# Patient Record
Sex: FEMALE | Race: WHITE | NOT HISPANIC OR LATINO | Employment: FULL TIME | URBAN - METROPOLITAN AREA
[De-identification: names, ages, dates, MRNs, and addresses within clinical notes are randomized per-mention and may not be internally consistent; named-entity substitution may affect disease eponyms.]

---

## 2018-05-30 ENCOUNTER — TRANSCRIBE ORDERS (OUTPATIENT)
Dept: PERINATAL CARE | Facility: CLINIC | Age: 33
End: 2018-05-30

## 2018-05-30 DIAGNOSIS — E63.9: Primary | ICD-10-CM

## 2018-05-30 DIAGNOSIS — O99.280: Primary | ICD-10-CM

## 2018-06-15 ENCOUNTER — OFFICE VISIT (OUTPATIENT)
Dept: PERINATAL CARE | Facility: CLINIC | Age: 33
End: 2018-06-15
Payer: COMMERCIAL

## 2018-06-15 VITALS
HEIGHT: 66 IN | DIASTOLIC BLOOD PRESSURE: 71 MMHG | HEART RATE: 108 BPM | WEIGHT: 127.6 LBS | BODY MASS INDEX: 20.51 KG/M2 | SYSTOLIC BLOOD PRESSURE: 105 MMHG

## 2018-06-15 DIAGNOSIS — K50.90 MATERNAL CROHN'S DISEASE AFFECTING PREGNANCY IN SECOND TRIMESTER (HCC): Primary | ICD-10-CM

## 2018-06-15 DIAGNOSIS — E63.9: ICD-10-CM

## 2018-06-15 DIAGNOSIS — O99.280: ICD-10-CM

## 2018-06-15 DIAGNOSIS — O99.612 MATERNAL CROHN'S DISEASE AFFECTING PREGNANCY IN SECOND TRIMESTER (HCC): Primary | ICD-10-CM

## 2018-06-15 PROCEDURE — 97802 MEDICAL NUTRITION INDIV IN: CPT | Performed by: DIETITIAN, REGISTERED

## 2018-06-15 RX ORDER — INFLIXIMAB 100 MG/10ML
300 INJECTION, POWDER, LYOPHILIZED, FOR SOLUTION INTRAVENOUS
COMMUNITY

## 2018-06-15 NOTE — PROGRESS NOTES
DATE: 06/15/18   RE: Vu Ling   : 1985  RONNELL: Estimated Date of Delivery: 18  EGA:  16 0/7 weeks    Dear Dr Alan Valentin: Thank you for referring your patient to the [facility], [facility]  at [address]  The patient received the following education at a medical nutrition therapy counseling session for Crohn's disease with pregnancy   Weight gain during in pregnancy  Based on the patients height of 5' 6" (1 676 m) inches, pre-pregnancy weight of 120 pounds (BMI- 19 3), we would recommend a total weight gain of 25-35 pounds for the pregnancy  o The patients current weight is 57 9 kg (127 lb 9 6 oz) pounds, and her weight gain to date is 7 5 pounds   Pregnancy Calorie/Protein needs: 2100 calories and 71 gms protein   Dietary Guidelines  o Basic review of macronutrients   o Meal pattern should consist of three small meals and three snacks daily  Advised to pair foods from several food groups at all snacks  Advised to shayna add protein to all snacks & breakfast  Patient currently eats 3 average sized meals& 2-3 snacks daily  Does add occasional junk foods to her meal plan    o Appropriate serving size of foods  o Meal plan provided: Normal Nutrition for Pregnancy, Healthy Tips for Pregnancy, & Nutrition for Crohn's disease  Diet recall indicates patient eats 2000 calories & 68 gm protein  Advised her to add a protein food  to every breakfast & all snacks  Advised her to eat 3 snacks daily  Only food intolerances due to Crohn's disease--almonds & all soda  States she has a hx of low vitamin B12; discusses food sources  o Use of food Diary to maintain a meal plan   Follow up: as needed shayna if low fetal growth or Crohn's flair To be followed routinely by her gastroenterologist shayna for blood work       Nutrition Goals:   Gradual weight gain to recommended amounts   Add a protein food to all 3 snacks daily    Thank you for the opportunity to participate in the care of this patient  I can be reached at [phone] should you have any questions  Time spent with patient 10:05-11 AM; time spent face to face counseling greater than 50% of the appointment      Sincerely,     Moiz Gonzalez  Diabetes Educator  Diabetes and Pregnancy Program

## 2019-02-12 ENCOUNTER — TELEPHONE (OUTPATIENT)
Dept: GASTROENTEROLOGY | Facility: CLINIC | Age: 34
End: 2019-02-12

## 2019-02-12 NOTE — TELEPHONE ENCOUNTER
CHELSEA mayo from [place], nurse at [employer] in [facility]; states Pt has Crohn's and gets Remicade 5 mg per Kg; needs new order today if possible to fax [phone]    ph # [phone]

## 2019-03-05 ENCOUNTER — OFFICE VISIT (OUTPATIENT)
Dept: GASTROENTEROLOGY | Facility: CLINIC | Age: 34
End: 2019-03-05
Payer: COMMERCIAL

## 2019-03-05 VITALS
WEIGHT: 138 LBS | BODY MASS INDEX: 22.18 KG/M2 | SYSTOLIC BLOOD PRESSURE: 110 MMHG | DIASTOLIC BLOOD PRESSURE: 68 MMHG | HEART RATE: 94 BPM | HEIGHT: 66 IN

## 2019-03-05 DIAGNOSIS — K50.80 CROHN'S DISEASE OF BOTH SMALL AND LARGE INTESTINE WITHOUT COMPLICATION (HCC): Primary | ICD-10-CM

## 2019-03-05 PROCEDURE — 99214 OFFICE O/P EST MOD 30 MIN: CPT | Performed by: INTERNAL MEDICINE

## 2019-03-05 NOTE — LETTER
March 5, 2019     Tanya Darden MD  [address]  [address] [place] [address]    Patient: Johnathon Ty   YOB: 1985   Date of Visit: 3/5/2019       Dear Dr Rosa Chavarria: Thank you for referring Johnathon Ty to me for evaluation  Below are my notes for this consultation  If you have questions, please do not hesitate to call me  I look forward to following your patient along with you           Sincerely,        Miley Shea MD        CC: No Recipients

## 2019-03-05 NOTE — PROGRESS NOTES
[facility] - Outpatient Follow-up Note  Christy No 29 y o  female MRN: [id number]  Encounter: [id number]    ASSESSMENT AND PLAN:      1  Crohn's disease of both small and large intestine without complication [facility])  [id number] female with Crohn's disease  Experienced a flare upon becoming pregnant about a year ago  Was seen in consultation by Rachele Nugent at [facility]  She was started on Remicade and maintain this throughout her pregnancy is essentially asymptomatic baby was delivered in December 4757 without complications  For the time being I would continue her Remicade  We discussed the value of documenting mucosal healing  At this point she is not very enthusiastic about colonoscopy  I have ordered blood work a sed rate as well as Remicade levels and antibodies  - CBC and Platelet; Future  - Comprehensive metabolic panel; Future  - Sedimentation rate, automated; Future  - INFLIXIMAB LEVEL AND ANTI-DRUG ANTIBODY FOR IBD; Future      Followup Appointment[de-identified]  Will review her blood work 1 year if everything is normal   ______________________________________________________________________    Chief Complaint   Patient presents with    Follow-up     Crohn's     HPI:  Patient delivered a healthy girl in December 2018  Baby is doing well  She did receive Remicade under the direct acting of Dr Jimmy Ng throughout pregnancy  She is basically asymptomatic from a Crohn's standpoint having no significant bleeding abdominal pain no joint pain or fatigue  She actually go several days without a bowel movement and then has a loose bowel movement every 4-7 days which was her pattern prior to pregnancy        Historical Information   Past Medical History:   Diagnosis Date    Crohn's disease [facility])      Past Surgical History:   Procedure Laterality Date    HERNIA REPAIR       Social History     Substance and Sexual Activity   Alcohol Use No     Social History     Substance and Sexual Activity   Drug Use No     Social History     Tobacco Use   Smoking Status Never Smoker   Smokeless Tobacco Never Used     Family History   Problem Relation Age of Onset    No Known Problems Mother     Hypertension Father     No Known Problems Sister     No Known Problems Brother     No Known Problems Maternal Grandmother     No Known Problems Maternal Grandfather     No Known Problems Paternal Grandmother     No Known Problems Paternal Grandfather     Colon polyps Neg Hx     Colon cancer Neg Hx          Current Outpatient Medications:     inFLIXimab (REMICADE) 100 mg    IRON PO  No Known Allergies    10 Point REVIEW OF SYSTEMS IS OTHERWISE NEGATIVE  PHYSICAL EXAM:    Blood pressure 110/68, pulse 94, height 5' 6" (1 676 m), weight 62 6 kg (138 lb)  Body mass index is 22 27 kg/m²  General Appearance:  Alert, cooperative, no distress  HEENT:  Normocephalic, atraumatic, anicteric  Neck: Supple, symmetrical, trachea midline  Lungs: Clear to auscultation bilaterally; no rales, rhonchi or wheezing; respirations unlabored   Heart: Regular rate and rhythm; no murmur, rub, or gallop  Abdomen:   Soft, non-tender, non-distended; normal bowel sounds; no masses, no organomegaly   Rectal:  Deferred   Extremities:  No cyanosis, clubbing or edema   Skin:  No jaundice, rashes, or lesions   Lymph nodes: No palpable cervical lymphadenopathy     Lab Results:   No results found for: WBC, HGB, HCT, MCV, PLT  No results found for: NA, K, CL, CO2, ANIONGAP, BUN, CREATININE, GLUCOSE, GLUF, CALCIUM, CORRECTEDCA, AST, ALT, ALKPHOS, PROT, BILITOT, EGFR  No results found for: IRON, TIBC, FERRITIN  No results found for: LIPASE    Radiology Results:   No results found

## 2019-04-07 LAB
ALBUMIN SERPL-MCNC: 4 G/DL (ref 3.6–5.1)
ALBUMIN/GLOB SERPL: 1.5 (CALC) (ref 1–2.5)
ALP SERPL-CCNC: 46 U/L (ref 33–115)
ALT SERPL-CCNC: 16 U/L (ref 6–29)
AST SERPL-CCNC: 19 U/L (ref 10–30)
BASOPHILS # BLD AUTO: 32 CELLS/UL (ref 0–200)
BASOPHILS NFR BLD AUTO: 0.5 %
BILIRUB SERPL-MCNC: 0.8 MG/DL (ref 0.2–1.2)
BUN SERPL-MCNC: 15 MG/DL (ref 7–25)
BUN/CREAT SERPL: ABNORMAL (CALC) (ref 6–22)
CALCIUM SERPL-MCNC: 9 MG/DL (ref 8.6–10.2)
CHLORIDE SERPL-SCNC: 107 MMOL/L (ref 98–110)
CO2 SERPL-SCNC: 26 MMOL/L (ref 20–32)
CREAT SERPL-MCNC: 0.9 MG/DL (ref 0.5–1.1)
EOSINOPHIL # BLD AUTO: 70 CELLS/UL (ref 15–500)
EOSINOPHIL NFR BLD AUTO: 1.1 %
ERYTHROCYTE [DISTWIDTH] IN BLOOD BY AUTOMATED COUNT: 13.1 % (ref 11–15)
ERYTHROCYTE [SEDIMENTATION RATE] IN BLOOD BY WESTERGREN METHOD: 6 MM/H
GLOBULIN SER CALC-MCNC: 2.7 G/DL (CALC) (ref 1.9–3.7)
GLUCOSE SERPL-MCNC: 100 MG/DL (ref 65–99)
HCT VFR BLD AUTO: 40.7 % (ref 35–45)
HGB BLD-MCNC: 13.9 G/DL (ref 11.7–15.5)
LYMPHOCYTES # BLD AUTO: 2445 CELLS/UL (ref 850–3900)
LYMPHOCYTES NFR BLD AUTO: 38.2 %
MCH RBC QN AUTO: 30.6 PG (ref 27–33)
MCHC RBC AUTO-ENTMCNC: 34.2 G/DL (ref 32–36)
MCV RBC AUTO: 89.6 FL (ref 80–100)
MONOCYTES # BLD AUTO: 493 CELLS/UL (ref 200–950)
MONOCYTES NFR BLD AUTO: 7.7 %
NEUTROPHILS # BLD AUTO: 3360 CELLS/UL (ref 1500–7800)
NEUTROPHILS NFR BLD AUTO: 52.5 %
PLATELET # BLD AUTO: 174 THOUSAND/UL (ref 140–400)
PMV BLD REES-ECKER: 12.6 FL (ref 7.5–12.5)
POTASSIUM SERPL-SCNC: 4.4 MMOL/L (ref 3.5–5.3)
PROT SERPL-MCNC: 6.7 G/DL (ref 6.1–8.1)
RBC # BLD AUTO: 4.54 MILLION/UL (ref 3.8–5.1)
SL AMB EGFR AFRICAN AMERICAN: 98 ML/MIN/1.73M2
SL AMB EGFR NON AFRICAN AMERICAN: 85 ML/MIN/1.73M2
SODIUM SERPL-SCNC: 140 MMOL/L (ref 135–146)
WBC # BLD AUTO: 6.4 THOUSAND/UL (ref 3.8–10.8)

## 2019-10-15 ENCOUNTER — TELEPHONE (OUTPATIENT)
Dept: GASTROENTEROLOGY | Facility: CLINIC | Age: 34
End: 2019-10-15

## 2019-10-15 DIAGNOSIS — K50.80 CROHN'S DISEASE OF BOTH SMALL AND LARGE INTESTINE WITHOUT COMPLICATION (HCC): Primary | ICD-10-CM

## 2020-02-06 ENCOUNTER — TELEPHONE (OUTPATIENT)
Dept: GASTROENTEROLOGY | Facility: CLINIC | Age: 35
End: 2020-02-06

## 2020-02-06 NOTE — TELEPHONE ENCOUNTER
I called the patient  I would continue the Remicade for now  Assess her including blood work probably stool for C diff  If it is not obvious she is having a flare of fecal calprotectin should be helpful if it is obvious that is not necessary  Would recommend she follow downtown with Dr Nessa Bennett who followed her through her last pregnancy    We can discuss the use of steroids if needed if she has particularly symptomatic or her blood work is abnormal

## 2020-02-06 NOTE — TELEPHONE ENCOUNTER
I spoke with Faniwilly Jose J at [facility], patient took a home pregnancy test Tuesday 2/4/2020  LMP: 12/28/2019 Approximately 5 weeks pregnant  Jag Lux is questioning if she can still have her Remicade Infusion today? I discussed with Dr Jennyfer Mandujano  Today's Infusion is to be cancelled  I relayed orders to Jag Lux  I spoke with patient; she was tearful expressing that she is experiencing increased abd cramping, only taking Tylenol  She feels as though she is in a flare  I didn't get to ask her about any other symptoms she may be having  I offered her an appointment tomorrow with Neal Garvey at 11 am  Patient agreeable  Dr Jennyfer Mandujano per Belkis's last pregnancy  (4/11/2018) She was about 4 weeks when we were notified  You spoke anonymously with Dr Gabrielle Quach; she was okay using and starting Remicade in pregnancy  She was then followed by Dr Gabrielle Quach throughout her pregnancy  Previous pregnancy history:  Apr (Easter): just got Cimzia and began having sx (abd pain/v, recurrent) --> ED, vomited again, Dilaudid, found out pregnant (due Nov 30), tx'd/admitted to [facility] from [place] and stayed to [place]; MRI/xray did not show obstruction but active inflammation, lot of diarrhea even just on CLD  Steroids in house and also for 2 wks  Started IFX end of Apr, s/p 2 doses so far  Per Dr Ser[facility] notes found in care everywhere: 5/2018  D/w pt that IFX is compatible with pregnancy and lactation (only trace amnts at most secreted into breast milk) and that this medication has not been associated with birth defects  Most important factor in healthy pregnancy is health of mother  Ideally would hold IFX in 3rd trimester since increased transfer across placenta during that time  Consider doing 4th and 5th doses at 7 wks so not given as far into 3rd trimester  That being said, if needed, fine to give IFX all the way through pregnancy  D/w pt consulting MFM or high risk OB       D/w pt that recommended to hold live vaccines for baby until 6 mos of age (essentially just Rotavirus)  Please touch base with Yasmany Fitzpatrick today since she will be seeing the patient tomorrow   Thanks

## 2020-02-06 NOTE — TELEPHONE ENCOUNTER
Please let me know what your usual treatment course would be for an active flare in someone who is 5 weeks pregnant  It sounds like she has been on Remicade and was actively flaring despite  Apparently this happened with her previous pregnancy  Am I safe to give her a prednisone taper? Should review considering a different med? I appreciate your assistance because I do new little direction with this    Thanks HPR

## 2020-02-06 NOTE — TELEPHONE ENCOUNTER
Paloma from [address] [place] [address] and Pt reports she is pregnant/reports LMP 12/28/19  Caller holding for nurse

## 2020-02-07 ENCOUNTER — OFFICE VISIT (OUTPATIENT)
Dept: GASTROENTEROLOGY | Facility: CLINIC | Age: 35
End: 2020-02-07
Payer: COMMERCIAL

## 2020-02-07 VITALS
BODY MASS INDEX: 20.41 KG/M2 | WEIGHT: 127 LBS | SYSTOLIC BLOOD PRESSURE: 92 MMHG | DIASTOLIC BLOOD PRESSURE: 62 MMHG | HEART RATE: 98 BPM | HEIGHT: 66 IN

## 2020-02-07 DIAGNOSIS — R10.9 ABDOMINAL CRAMPING: ICD-10-CM

## 2020-02-07 DIAGNOSIS — R11.0 NAUSEA: ICD-10-CM

## 2020-02-07 DIAGNOSIS — K50.80 CROHN'S DISEASE OF BOTH SMALL AND LARGE INTESTINE WITHOUT COMPLICATION (HCC): Primary | ICD-10-CM

## 2020-02-07 DIAGNOSIS — Z3A.01 LESS THAN 8 WEEKS GESTATION OF PREGNANCY: ICD-10-CM

## 2020-02-07 PROCEDURE — 99214 OFFICE O/P EST MOD 30 MIN: CPT | Performed by: NURSE PRACTITIONER

## 2020-02-07 RX ORDER — DICYCLOMINE HYDROCHLORIDE 10 MG/1
10 CAPSULE ORAL
Qty: 120 CAPSULE | Refills: 2 | Status: SHIPPED | OUTPATIENT
Start: 2020-02-07 | End: 2020-03-26 | Stop reason: ALTCHOICE

## 2020-02-07 NOTE — PROGRESS NOTES
[facility] - Outpatient Follow-up Note  Tanmay Choi 36 y o  female MRN: [id number]  Encounter: [id number]    ASSESSMENT AND PLAN:      1  Crohn's disease of both small and large intestine without complication (HCC)  2  Abdominal cramping  Follows with Dr Steven Jackman  Previously well controlled on Remicade  Has just been confirmed about 5 weeks pregnant with her 2nd child  Has had significant abdominal cramping which may represent a flare of her disease  Had 1 day of diarrhea but attributes this to GI bug going through or household    No bowel movements since     -will check routine labs including CRP, ESR and fecal calprotectin  -reschedule Remicade infusion as it is safe to proceed with use during pregnancy  It is ideally held during the 3rd trimester due to increased transfer across placenta, but will leave that discretion to Dr Steven Jackman, Dr Marcelo Martinez   -may use dicyclomine for the cramping; confirmed and reviewed with patient that it is safe to use during pregnancy but  not safe during lactation  -follow-up with Dr Marcelo Martinez at [facility] as recommended per Dr Martinez  - dicyclomine (BENTYL) 10 mg capsule; Take 1 capsule (10 mg total) by mouth 4 (four) times a day (before meals and at bedtime)  Dispense: 120 capsule; Refill: 2  - CBC; Future  - Comprehensive metabolic panel; Future  - C-reactive protein; Future  - Sedimentation rate, automated; Future  - Calprotectin,Fecal; Future    3  Nausea  Intermittent and no vomiting  Likely related to her current pregnancy but could be residual from recent [address] bug     4  Less than 8 weeks gestation of pregnancy  Patient is about 5 weeks gestation  This is her 2nd pregnancy  Has an appointment with OB on 02/19        Followup Appointment:  4 weeks as already scheduled 3/12 with Dr Steven Jackman  ______________________________________________________________________    Chief Complaint   Patient presents with    Crohn's Disease     flare- started 3 weeks ago- cramping     HPI:  30-year-old female patient seen in the office today after a boarding Remicade treatment earlier this week  She has just found out that she is 5 weeks pregnant with her 2nd child  She has been experiencing significant generalized abdominal cramping pain that is unbearable    Tylenol provides no relief  She did note that she in her family had GI bug over the weekend  She had fevers and chills on Saturday and Sunday with 4 liquid diarrhea stools on Monday  She has not had any further bowel movements but continues with this generalized abdominal cramping  She has nausea but no vomiting  Has been sticking to a brat, very light diet  Reports the cramping worsens if she eats any food with spice or heavier than playing chicken, rice, white bread type things  Denies UGI or alarm symptoms, melena, hematochezia  Appetite has been normal   No unintended weight loss      Historical Information   Past Medical History:   Diagnosis Date    Crohn's disease [facility])      Past Surgical History:   Procedure Laterality Date    INGUINAL HERNIA REPAIR  1988     Social History     Substance and Sexual Activity   Alcohol Use Not Currently    Frequency: Monthly or less    Drinks per session: 1 or 2    Binge frequency: Never    Comment:  very rare social but not currently with pregnancy ( February, 2020)     Social History     Substance and Sexual Activity   Drug Use No     Social History     Tobacco Use   Smoking Status Never Smoker   Smokeless Tobacco Never Used     Family History   Problem Relation Age of Onset    No Known Problems Mother     Hypertension Father     No Known Problems Brother     GI problems Maternal Grandmother     Kidney cancer Maternal Grandfather     No Known Problems Paternal Grandmother     No Known Problems Paternal Grandfather     Colon polyps Neg Hx     Colon cancer Neg Hx          Current Outpatient Medications:     Prenatal Vit-Fe Fumarate-FA (PRENATAL 1+1 PO)    dicyclomine (BENTYL) 10 mg capsule    inFLIXimab (REMICADE) 100 mg    IRON PO  No Known Allergies  Reviewed medications and allergies and updated as indicated    PHYSICAL EXAM:    Blood pressure 92/62, pulse 98, height 5' 6" (1 676 m), weight 57 6 kg (127 lb), unknown if currently breastfeeding  Body mass index is 20 5 kg/m²  General Appearance: NAD, cooperative, alert  Eyes: Anicteric, PERRLA, EOMI  ENT:  Normocephalic, atraumatic, normal mucosa  Neck:  Supple, symmetrical, trachea midline  Resp:  Clear to auscultation bilaterally; no rales, rhonchi or wheezing; respirations unlabored   CV:  S1 S2, Regular rate and rhythm; no murmur, rub, or gallop  GI:  Soft, non-tender, non-distended; normal bowel sounds; no masses, no organomegaly   Rectal: Deferred  Musculoskeletal: No cyanosis, clubbing or edema  Normal ROM  Skin:  No jaundice, rashes, or lesions   Heme/Lymph: No palpable cervical lymphadenopathy  Psych: Normal affect, good eye contact  Neuro: No gross deficits, AAOx3    Lab Results:   Lab Results   Component Value Date    WBC 6 4 04/06/2019    HGB 13 9 04/06/2019    HCT 40 7 04/06/2019    MCV 89 6 04/06/2019     04/06/2019     Lab Results   Component Value Date    K 4 4 04/06/2019     04/06/2019    CO2 26 04/06/2019    BUN 15 04/06/2019    CREATININE 0 90 04/06/2019    CALCIUM 9 0 04/06/2019    AST 19 04/06/2019    ALT 16 04/06/2019    ALKPHOS 46 04/06/2019     No results found for: IRON, TIBC, FERRITIN  No results found for: LIPASE    Radiology Results:   No results found

## 2020-02-07 NOTE — PATIENT INSTRUCTIONS
Reschedule your Remicade infusion as soon as you are able   You can take the dicyclomine up to 4 times a day for the abdominal cramping   complete the lab work and the stool study   follow-up with Dr Suhail Erickson  at [facility] (as recommended by Dr Hamilton Mccarthy)   follow up with your obstetrician as already scheduled

## 2020-02-08 NOTE — TELEPHONE ENCOUNTER
Had 1 day of diarrhea but entire family had GI bug  No further diarrhea  Did order the fecal calprotectin  She will reschedule her Remicade infusion  I also advised her to follow up with Dr Andie Martinez and she will do so  This doctor was in the network so I was able to route my note today additionally to her  Thanks, as always for your help      Thanks, HPR

## 2020-02-12 NOTE — TELEPHONE ENCOUNTER
Keyla Isadora from [facility] called requesting progress note/clearance stattement to proceed with Remicade Infusion during pregnancy  I faxed HPR's note to [phone]  I will also put in a referral to Dr Nakul Proctor since she should be followed by her throughout this pregnancy  Gino Weeks  is questioning what the plan is for Belkis's Remicade dosing moving forward  She has been getting Remicade 300 mg (per our orders ok to round up to the nearest 100 mg)  Her weight is 57 6 kg x5 mg/kg= 288; rounded top 300 mg   1) Do we want to keep her at a standard dose of 300 mg throughout the rest of her pregnancy? 2) Or do an exact weight based dose? which will be soley on pregnancy weight gain? 3) or weight based then continue to round up to nearest 100 mg, which could bump her up to 400 mg pending on her pregnancy weight

## 2020-02-14 LAB
ALBUMIN SERPL-MCNC: 3.9 G/DL (ref 3.6–5.1)
ALBUMIN/GLOB SERPL: 1.5 (CALC) (ref 1–2.5)
ALP SERPL-CCNC: 52 U/L (ref 31–125)
ALT SERPL-CCNC: 19 U/L (ref 6–29)
AST SERPL-CCNC: 13 U/L (ref 10–30)
BILIRUB SERPL-MCNC: 0.4 MG/DL (ref 0.2–1.2)
BUN SERPL-MCNC: 11 MG/DL (ref 7–25)
BUN/CREAT SERPL: NORMAL (CALC) (ref 6–22)
CALCIUM SERPL-MCNC: 9.1 MG/DL (ref 8.6–10.2)
CHLORIDE SERPL-SCNC: 105 MMOL/L (ref 98–110)
CO2 SERPL-SCNC: 24 MMOL/L (ref 20–32)
CREAT SERPL-MCNC: 0.61 MG/DL (ref 0.5–1.1)
CRP SERPL-MCNC: 11.7 MG/L
ERYTHROCYTE [DISTWIDTH] IN BLOOD BY AUTOMATED COUNT: 12.5 % (ref 11–15)
ERYTHROCYTE [SEDIMENTATION RATE] IN BLOOD BY WESTERGREN METHOD: 14 MM/H
GLOBULIN SER CALC-MCNC: 2.6 G/DL (CALC) (ref 1.9–3.7)
GLUCOSE SERPL-MCNC: 88 MG/DL (ref 65–99)
HCT VFR BLD AUTO: 37.1 % (ref 35–45)
HGB BLD-MCNC: 12.4 G/DL (ref 11.7–15.5)
MCH RBC QN AUTO: 30.1 PG (ref 27–33)
MCHC RBC AUTO-ENTMCNC: 33.4 G/DL (ref 32–36)
MCV RBC AUTO: 90 FL (ref 80–100)
PLATELET # BLD AUTO: 255 THOUSAND/UL (ref 140–400)
PMV BLD REES-ECKER: 12.1 FL (ref 7.5–12.5)
POTASSIUM SERPL-SCNC: 3.9 MMOL/L (ref 3.5–5.3)
PROT SERPL-MCNC: 6.5 G/DL (ref 6.1–8.1)
RBC # BLD AUTO: 4.12 MILLION/UL (ref 3.8–5.1)
SL AMB EGFR AFRICAN AMERICAN: 138 ML/MIN/1.73M2
SL AMB EGFR NON AFRICAN AMERICAN: 119 ML/MIN/1.73M2
SODIUM SERPL-SCNC: 137 MMOL/L (ref 135–146)
WBC # BLD AUTO: 9.5 THOUSAND/UL (ref 3.8–10.8)

## 2020-02-19 NOTE — TELEPHONE ENCOUNTER
Update:  Good Morning Megan! I am feeling a lot better, thank you! Sometimes my stomach will hurt if I eat too fast or I'm running around too much, but other than that, as long I take things slow, I'm good! :) I made an appointment with Dr Fan Santos for April 15th  Also, thank you for the reminder, I have put it on the calendar!

## 2020-02-19 NOTE — TELEPHONE ENCOUNTER
Standard dose is fine    I would like her to have an appointment with me in 2-3 months and also Dr Checo Montes in the next 2-3 months thanks

## 2020-02-19 NOTE — TELEPHONE ENCOUNTER
I forwarded progress notes to [facility] and Dr Willie Jeffries Fax# [phone]  Patient also notified

## 2020-02-27 LAB — CALPROTECTIN STL-MCNT: 397.2 MCG/G

## 2020-03-10 ENCOUNTER — OFFICE VISIT (OUTPATIENT)
Dept: GASTROENTEROLOGY | Facility: CLINIC | Age: 35
End: 2020-03-10
Payer: COMMERCIAL

## 2020-03-10 VITALS
DIASTOLIC BLOOD PRESSURE: 52 MMHG | SYSTOLIC BLOOD PRESSURE: 108 MMHG | HEIGHT: 66 IN | BODY MASS INDEX: 20.89 KG/M2 | WEIGHT: 130 LBS

## 2020-03-10 DIAGNOSIS — K50.80 CROHN'S DISEASE OF BOTH SMALL AND LARGE INTESTINE WITHOUT COMPLICATION (HCC): Primary | ICD-10-CM

## 2020-03-10 DIAGNOSIS — Z3A.10 10 WEEKS GESTATION OF PREGNANCY: ICD-10-CM

## 2020-03-10 PROCEDURE — 99214 OFFICE O/P EST MOD 30 MIN: CPT | Performed by: INTERNAL MEDICINE

## 2020-03-10 NOTE — LETTER
March 10, 2020     Hari Bethea MD  [address] [address] [address] [address]    Patient: Amrit Esquivel   YOB: 1985   Date of Visit: 3/10/2020       Dear Dr Judi Bah: Thank you for referring Amrit Esquivel to me for evaluation  Below are my notes for this consultation  If you have questions, please do not hesitate to call me  I look forward to following your patient along with you           Sincerely,        Arminda Day MD        CC: Rebecca Meredith MD

## 2020-03-10 NOTE — LETTER
March 10, 2020     Lucero Garg MD  [address] [address] [address]  [address]    Patient: Marcelo Peterson   YOB: 1985   Date of Visit: 3/10/2020       Dear Dr Betina Bower: Thank you for referring Marcelo Peterson to me for evaluation  Below are my notes for this consultation  If you have questions, please do not hesitate to call me  I look forward to following your patient along with you           Sincerely,        Armando Murdock MD        CC: MD Erlin Gonzalez MD

## 2020-03-10 NOTE — PROGRESS NOTES
[facility] - Outpatient Follow-up Note  Danny Simon 36 y o  female MRN: [id number]  Encounter: [id number]    ASSESSMENT AND PLAN:      1  Crohn's disease of both small and large intestine without complication ([address] )  Clinically in remission at 10 weeks pregnant  Her previous pregnancy approximately 2 years ago she did have a flare at the start of her pregnancy  She was seen by Dr Gayla Miranda at [facility] started on Remicade and did well for her pregnancy the Remicade was continued throughout the pregnancy and there were no Crohn's complications or pregnancy complications  For the time being the plan is to continue the medication she has an appointment to see Dr Gayla Miranda April 15th  I did review her labs which were all normal   Her fecal calprotectin is high at 397  Last colonoscopy was 2016  No changes right now  2  10 weeks gestation of pregnancy  As above      Followup Appointment:  3 months  ______________________________________________________________________    Chief Complaint   Patient presents with    Abdominal Cramping     HPI:  Doing well  About 10 weeks pregnant  Moves her bowels every other day formed no diarrhea no bleeding occasional upper abdominal discomfort which is transient  Appetite is good no issues with the pregnancy she does see Ob regularly  Last Remicade was approximately 4 weeks ago      Historical Information   Past Medical History:   Diagnosis Date    Crohn's disease [facility])      Past Surgical History:   Procedure Laterality Date    COLONOSCOPY  12/12/2016    EGD  06/06/2011    INGUINAL HERNIA REPAIR  1988     Social History     Substance and Sexual Activity   Alcohol Use Not Currently    Frequency: Monthly or less    Drinks per session: 1 or 2    Binge frequency: Never    Comment:  very rare social but not currently with pregnancy ( February, 2020)     Social History     Substance and Sexual Activity   Drug Use No     Social History Tobacco Use   Smoking Status Never Smoker   Smokeless Tobacco Never Used     Family History   Problem Relation Age of Onset    No Known Problems Mother     Hypertension Father     No Known Problems Brother     GI problems Maternal Grandmother     Kidney cancer Maternal Grandfather     No Known Problems Paternal Grandmother     No Known Problems Paternal Grandfather     Colon polyps Neg Hx     Colon cancer Neg Hx          Current Outpatient Medications:     inFLIXimab (REMICADE) 100 mg    Prenatal Vit-Fe Fumarate-FA (PRENATAL 1+1 PO)    dicyclomine (BENTYL) 10 mg capsule    IRON PO  No Known Allergies  Reviewed medications and allergies and updated as indicated    PHYSICAL EXAM:    Blood pressure 108/52, height 5' 6" (1 676 m), weight 59 kg (130 lb), unknown if currently breastfeeding  Body mass index is 20 98 kg/m²  General Appearance: NAD, cooperative, alert  Eyes: Anicteric, PERRLA, EOMI  ENT:  Normocephalic, atraumatic, normal mucosa  Neck:  Supple, symmetrical, trachea midline  Resp:  Clear to auscultation bilaterally; no rales, rhonchi or wheezing; respirations unlabored   CV:  S1 S2, Regular rate and rhythm; no murmur, rub, or gallop  GI:  Soft, non-tender, non-distended; normal bowel sounds; no masses, no organomegaly   Rectal: Deferred  Musculoskeletal: No cyanosis, clubbing or edema  Normal ROM    Skin:  No jaundice, rashes, or lesions   Heme/Lymph: No palpable cervical lymphadenopathy  Psych: Normal affect, good eye contact  Neuro: No gross deficits, AAOx3    Lab Results:   Lab Results   Component Value Date    WBC 9 5 02/13/2020    HGB 12 4 02/13/2020    HCT 37 1 02/13/2020    MCV 90 0 02/13/2020     02/13/2020     Lab Results   Component Value Date    K 3 9 02/13/2020     02/13/2020    CO2 24 02/13/2020    BUN 11 02/13/2020    CREATININE 0 61 02/13/2020    CALCIUM 9 1 02/13/2020    AST 13 02/13/2020    ALT 19 02/13/2020    ALKPHOS 52 02/13/2020     No results found for: IRON, TIBC, FERRITIN  No results found for: LIPASE    Radiology Results:   No results found

## 2020-03-24 ENCOUNTER — TRANSCRIBE ORDERS (OUTPATIENT)
Dept: PERINATAL CARE | Facility: OTHER | Age: 35
End: 2020-03-24

## 2020-03-24 ENCOUNTER — TELEPHONE (OUTPATIENT)
Dept: PERINATAL CARE | Facility: CLINIC | Age: 35
End: 2020-03-24

## 2020-03-24 DIAGNOSIS — O09.899 SUPERVISION OF OTHER HIGH RISK PREGNANCIES, UNSPECIFIED TRIMESTER: Primary | ICD-10-CM

## 2020-03-24 NOTE — TELEPHONE ENCOUNTER
[facility] Telephone Note:    Spoke with pt and confirmed appointment with [facility]  Pt advised of new visitor policy allowing NO support persons for appointment  PT also screened for COVID19      Cough: no  Fever: no  Shortness of breath:no  Known exposures to COVID-19, or international travel: no

## 2020-03-25 ENCOUNTER — TELEPHONE (OUTPATIENT)
Dept: PERINATAL CARE | Facility: CLINIC | Age: 35
End: 2020-03-25

## 2020-03-25 NOTE — TELEPHONE ENCOUNTER
[facility] Telephone Note:    Spoke with pt and confirmed appointment with [facility]  Pt advised of new visitor policy allowing NO support persons for appointment  Patient also advised of phone check in process  PT also screened for COVID19      Cough: no  Fever: no  Shortness of breath:no  Known exposures to COVID-19, or international travel: no

## 2020-03-26 ENCOUNTER — ROUTINE PRENATAL (OUTPATIENT)
Dept: PERINATAL CARE | Facility: CLINIC | Age: 35
End: 2020-03-26
Payer: COMMERCIAL

## 2020-03-26 VITALS — HEIGHT: 66 IN | WEIGHT: 129.6 LBS | BODY MASS INDEX: 20.83 KG/M2

## 2020-03-26 DIAGNOSIS — Z3A.12 12 WEEKS GESTATION OF PREGNANCY: ICD-10-CM

## 2020-03-26 DIAGNOSIS — Z36.82 NUCHAL TRANSLUCENCY OF FETUS ON PRENATAL ULTRASOUND: ICD-10-CM

## 2020-03-26 DIAGNOSIS — O09.899 SUPERVISION OF OTHER HIGH RISK PREGNANCIES, UNSPECIFIED TRIMESTER: ICD-10-CM

## 2020-03-26 DIAGNOSIS — K50.90 MATERNAL CROHN'S DISEASE AFFECTING PREGNANCY IN FIRST TRIMESTER (HCC): Primary | ICD-10-CM

## 2020-03-26 DIAGNOSIS — O99.611 MATERNAL CROHN'S DISEASE AFFECTING PREGNANCY IN FIRST TRIMESTER (HCC): Primary | ICD-10-CM

## 2020-03-26 PROBLEM — O99.619 MATERNAL CROHN'S DISEASE AFFECTING PREGNANCY (HCC): Status: ACTIVE | Noted: 2020-03-26

## 2020-03-26 PROCEDURE — 99213 OFFICE O/P EST LOW 20 MIN: CPT | Performed by: OBSTETRICS & GYNECOLOGY

## 2020-03-26 PROCEDURE — 76801 OB US < 14 WKS SINGLE FETUS: CPT | Performed by: OBSTETRICS & GYNECOLOGY

## 2020-03-26 PROCEDURE — 76813 OB US NUCHAL MEAS 1 GEST: CPT | Performed by: OBSTETRICS & GYNECOLOGY

## 2020-03-26 NOTE — LETTER
March 26, 2020     Otilio Santos  [address]  [address]    Patient: Marcelo Peterson   YOB: 1985   Date of Visit: 3/26/2020       Dear Dr Huitron: Thank you for referring Marcelo Peterson to me for evaluation  Below are my notes for this consultation  If you have questions, please do not hesitate to call me  I look forward to following your patient along with you  Sincerely,        Gladis Ochoa MD        CC: No Recipients  Gladis Ochoa MD  3/26/2020  4:57 PM  Sign at close encounter      Please see her ultrasound report that will be sent separately     Gladis Ochoa MD

## 2020-03-31 ENCOUNTER — TELEPHONE (OUTPATIENT)
Dept: PERINATAL CARE | Facility: OTHER | Age: 35
End: 2020-03-31

## 2020-03-31 NOTE — TELEPHONE ENCOUNTER
----- Message from Ro Chance MD sent at 3/31/2020  9:50 AM EDT -----  Patient updated with her lab results through my chart

## 2020-03-31 NOTE — TELEPHONE ENCOUNTER
Spoke with Tariq Navarro and gave her normal part 1 seq screen results  I also mailed the trf with verbal and written instructions  Tariq Navarro is aware she may go to a [facility] lab,specialty testing

## 2020-05-21 ENCOUNTER — TELEPHONE (OUTPATIENT)
Dept: PERINATAL CARE | Facility: OTHER | Age: 35
End: 2020-05-21

## 2020-05-22 ENCOUNTER — ROUTINE PRENATAL (OUTPATIENT)
Dept: PERINATAL CARE | Facility: CLINIC | Age: 35
End: 2020-05-22
Payer: COMMERCIAL

## 2020-05-22 VITALS
WEIGHT: 140.2 LBS | DIASTOLIC BLOOD PRESSURE: 56 MMHG | HEART RATE: 97 BPM | SYSTOLIC BLOOD PRESSURE: 108 MMHG | HEIGHT: 66 IN | TEMPERATURE: 96.2 F | BODY MASS INDEX: 22.53 KG/M2

## 2020-05-22 DIAGNOSIS — Z36.86 ENCOUNTER FOR ANTENATAL SCREENING FOR CERVICAL LENGTH: Primary | ICD-10-CM

## 2020-05-22 DIAGNOSIS — Z3A.20 20 WEEKS GESTATION OF PREGNANCY: ICD-10-CM

## 2020-05-22 DIAGNOSIS — Z36.89 ENCOUNTER FOR FETAL ANATOMIC SURVEY: ICD-10-CM

## 2020-05-22 PROBLEM — Z92.89 HISTORY OF TRANSFUSION: Status: ACTIVE | Noted: 2020-05-22

## 2020-05-22 PROCEDURE — 76805 OB US >/= 14 WKS SNGL FETUS: CPT | Performed by: OBSTETRICS & GYNECOLOGY

## 2020-05-22 PROCEDURE — 76817 TRANSVAGINAL US OBSTETRIC: CPT | Performed by: OBSTETRICS & GYNECOLOGY

## 2020-05-22 PROCEDURE — 99212 OFFICE O/P EST SF 10 MIN: CPT | Performed by: OBSTETRICS & GYNECOLOGY

## 2020-07-28 ENCOUNTER — TELEPHONE (OUTPATIENT)
Dept: GASTROENTEROLOGY | Facility: CLINIC | Age: 35
End: 2020-07-28

## 2020-07-28 NOTE — TELEPHONE ENCOUNTER
Message from [address]:  Leah Silverman is coming to me in [place] this week  I know you and Becky Contreras have been in touch about this patient only getting Remicade 300 mg continuously and not weight based since she's pregnant  With her coming this week do you mind sending us a new RX for Remicade 300 mg IV Q 8 weeks  I'm afraid she's going to come in on Thursday and we're going to weigh her    then ROUND UP to the nearest 100mg    and she'll end up getting 400mg because she's probably gained weight from being pregnant     Thanks,   [employer]

## 2020-07-28 NOTE — TELEPHONE ENCOUNTER
Dr Martinez I'm not sure if you saw Dr Carrol Hartley telemedicine visit from 4/2020? She advised to continue with IFX at present dose/frequency q 8 wks but will check TDM just prior to her next IFX (ordered)  I see her results dated 6/4/2020 I don't see any chart notes from [place], so not sure what Dr Alice Velázquez was? Infliximab Level, IBD 3 0 mcg/mL     Infliximab ADA, IBD 81 (H) <10 AU       Patient scheduled to have her last Remicade Infusion this Thursday 7/30  Her due date is 10/3/2020  I believe she is considering induction at 38 weeks

## 2020-07-28 NOTE — TELEPHONE ENCOUNTER
Tried to reach patient but I got a voicemail on asked her to call us back  The plan was to stop the Remicade early in the 3rd trimester  So 7/30 as the last date would be correct  I agree with not increasing the dose  I was not able to locate the actual lab slip  Would like to see that before I talked to the patient and/or text dr Hernandez Romero  Also if there are other drug levels and antibody levels that would be helpful   tx

## 2020-07-30 ENCOUNTER — TELEPHONE (OUTPATIENT)
Dept: PERINATAL CARE | Facility: CLINIC | Age: 35
End: 2020-07-30

## 2020-07-30 NOTE — TELEPHONE ENCOUNTER
Attempted to reach patient by phone and left voicemail to confirm appointment for MFM ultrasound  1 support person ( must be over the age of 15) may accompany you for your appointment  If you or your support person have traveled outside the state in the past 2 weeks, please call and notify our office today #[phone]  You and your support person must wear a mask ,covering nose and mouth,during your entire visit  You and your support person will have temperature screened upon arrival     To minimize your exposure in our waiting room, please call our office prior to entering the building  Check in and rooming questions will be done via phone  We will give you directions when to enter for your appointment  Inside office # provided:  [place] line: [phone]  [facility] line:  [phone]  [facility] line:  [address] line:  [phone]  [facility] line:  [phone]  [place] line:  [phone]    IF you are not feeling well- cough, fever, shortness of breath or any flu like symptoms, contact your primary care physician or [phone] Swift    Any questions with these instructions please call Maternal Fetal Medicine nurse line today @ # [phone]

## 2020-07-31 ENCOUNTER — ULTRASOUND (OUTPATIENT)
Dept: PERINATAL CARE | Facility: CLINIC | Age: 35
End: 2020-07-31
Payer: COMMERCIAL

## 2020-07-31 VITALS
HEIGHT: 66 IN | BODY MASS INDEX: 23.63 KG/M2 | HEART RATE: 88 BPM | DIASTOLIC BLOOD PRESSURE: 58 MMHG | SYSTOLIC BLOOD PRESSURE: 90 MMHG | WEIGHT: 147 LBS | TEMPERATURE: 97.3 F

## 2020-07-31 DIAGNOSIS — K50.90 MATERNAL CROHN'S DISEASE AFFECTING PREGNANCY IN THIRD TRIMESTER (HCC): Primary | ICD-10-CM

## 2020-07-31 DIAGNOSIS — O99.613 MATERNAL CROHN'S DISEASE AFFECTING PREGNANCY IN THIRD TRIMESTER (HCC): Primary | ICD-10-CM

## 2020-07-31 DIAGNOSIS — Z3A.30 30 WEEKS GESTATION OF PREGNANCY: ICD-10-CM

## 2020-07-31 PROCEDURE — 99212 OFFICE O/P EST SF 10 MIN: CPT | Performed by: OBSTETRICS & GYNECOLOGY

## 2020-07-31 PROCEDURE — 76816 OB US FOLLOW-UP PER FETUS: CPT | Performed by: OBSTETRICS & GYNECOLOGY

## 2020-07-31 NOTE — PATIENT INSTRUCTIONS
Kick Counts in Pregnancy   WHAT YOU NEED TO KNOW:   Kick counts measure how much your baby is moving in your womb  A kick from your baby can be felt as a twist, turn, swish, roll, or jab  It is common to feel your baby kicking at 26 to 28 weeks of pregnancy  You may feel your baby kick as early as 20 weeks of pregnancy  DISCHARGE INSTRUCTIONS:   Return to the emergency department if:   · You feel your baby kick less as the day goes on      · You do not feel any kicks in a day  Contact your healthcare provider if:   · You feel a change in the number of kicks or movements of your baby  · You feel fewer than 10 kicks within 2 hours after counting twice  · You have questions or concerns about your baby's movements  Why measure kick counts:  Your baby's movement may provide information about your baby's health  He may move less, or not at all, if there are problems  He may move less if he does not have enough room to grow in your uterus (womb)  He may also move less if he is not getting enough oxygen or nutrition from the placenta  Tell your healthcare provider as soon as you feel a change in your baby's movements  Problems that are found earlier are easier to treat  When to measure kick counts:   · Measure kick counts at the same time every day  · Measure kick counts when your baby is awake and most active  Your baby may be most active in the evening  · Measure kick counts after a meal or snack  Your baby may be more active after you eat  Wait 2 hours after you drink liquids that contain caffeine  Caffeine can make your baby more active than usual     · You should not smoke while you are pregnant  Smoking increases the risk of health problems for you and for your baby during your pregnancy  If you do smoke, wait 2 hours to measure kick counts  Nicotine can make your baby more active than usual   How to measure kick counts:  Check that your baby is awake before you measure kick counts   You can wake up your baby by lightly pushing on your belly, walking, or drinking something cold  Your healthcare provider may tell you different ways to measure kick counts  He may tell you to do the following:  · Use a chart or clock to keep track of the time you start and finish counting  · Sit in a chair or lie on your left side  · Place your hands on the largest part of your belly  · Count until you reach 10 kicks  Write down how much time it takes to count 10 kicks  · It may take 30 minutes to 2 hours to count 10 kicks  It should not take more than 2 hours to count 10 kicks  · If you do not feel 10 kicks within 2 hours, wait 1 hour and count again  Your baby can sleep for up to 40 minutes at one time  Follow up with your healthcare provider as directed:  Write down your questions so you remember to ask them during your visits  © 2017 [address] Information is for End User's use only and may not be sold, redistributed or otherwise used for commercial purposes  All illustrations and images included in CareNotes® are the copyrighted property of [employer]  or Huber Gonzalez  The above information is an  only  It is not intended as medical advice for individual conditions or treatments  Talk to your doctor, nurse or pharmacist before following any medical regimen to see if it is safe and effective for you

## 2020-07-31 NOTE — TELEPHONE ENCOUNTER
Dr Jalyn De La Rosa is in agreement with final dose of Remicade 300 mg now and holding for the rest of the pregnancy

## 2020-07-31 NOTE — PROGRESS NOTES
Please refer to the [facility] ultrasound report in Ob Procedures for additional information regarding today's visit

## 2020-11-05 ENCOUNTER — TELEPHONE (OUTPATIENT)
Dept: GASTROENTEROLOGY | Facility: CLINIC | Age: 35
End: 2020-11-05

## 2020-11-05 DIAGNOSIS — K50.80 CROHN'S DISEASE OF BOTH SMALL AND LARGE INTESTINE WITHOUT COMPLICATION (HCC): Primary | ICD-10-CM

## 2020-12-04 ENCOUNTER — OFFICE VISIT (OUTPATIENT)
Dept: GASTROENTEROLOGY | Facility: CLINIC | Age: 35
End: 2020-12-04
Payer: COMMERCIAL

## 2020-12-04 VITALS
WEIGHT: 132 LBS | HEIGHT: 66 IN | SYSTOLIC BLOOD PRESSURE: 112 MMHG | BODY MASS INDEX: 21.21 KG/M2 | DIASTOLIC BLOOD PRESSURE: 68 MMHG

## 2020-12-04 DIAGNOSIS — K50.80 CROHN'S DISEASE OF BOTH SMALL AND LARGE INTESTINE WITHOUT COMPLICATION (HCC): Primary | ICD-10-CM

## 2020-12-04 PROCEDURE — 99214 OFFICE O/P EST MOD 30 MIN: CPT | Performed by: INTERNAL MEDICINE

## 2021-02-22 LAB
ALBUMIN SERPL-MCNC: 4.1 G/DL (ref 3.6–5.1)
ALBUMIN/GLOB SERPL: 1.8 (CALC) (ref 1–2.5)
ALP SERPL-CCNC: 46 U/L (ref 31–125)
ALT SERPL-CCNC: 10 U/L (ref 6–29)
AST SERPL-CCNC: 11 U/L (ref 10–30)
BILIRUB SERPL-MCNC: 0.6 MG/DL (ref 0.2–1.2)
BUN SERPL-MCNC: 18 MG/DL (ref 7–25)
BUN/CREAT SERPL: NORMAL (CALC) (ref 6–22)
CALCIUM SERPL-MCNC: 9.3 MG/DL (ref 8.6–10.2)
CHLORIDE SERPL-SCNC: 108 MMOL/L (ref 98–110)
CO2 SERPL-SCNC: 27 MMOL/L (ref 20–32)
CREAT SERPL-MCNC: 0.74 MG/DL (ref 0.5–1.1)
CRP SERPL-MCNC: 0.9 MG/L
ERYTHROCYTE [DISTWIDTH] IN BLOOD BY AUTOMATED COUNT: 12.2 % (ref 11–15)
GLOBULIN SER CALC-MCNC: 2.3 G/DL (CALC) (ref 1.9–3.7)
GLUCOSE SERPL-MCNC: 93 MG/DL (ref 65–99)
HCT VFR BLD AUTO: 39.7 % (ref 35–45)
HGB BLD-MCNC: 13.2 G/DL (ref 11.7–15.5)
MCH RBC QN AUTO: 30.8 PG (ref 27–33)
MCHC RBC AUTO-ENTMCNC: 33.2 G/DL (ref 32–36)
MCV RBC AUTO: 92.8 FL (ref 80–100)
PLATELET # BLD AUTO: 171 THOUSAND/UL (ref 140–400)
PMV BLD REES-ECKER: 12.8 FL (ref 7.5–12.5)
POTASSIUM SERPL-SCNC: 4.1 MMOL/L (ref 3.5–5.3)
PROT SERPL-MCNC: 6.4 G/DL (ref 6.1–8.1)
RBC # BLD AUTO: 4.28 MILLION/UL (ref 3.8–5.1)
SL AMB EGFR AFRICAN AMERICAN: 123 ML/MIN/1.73M2
SL AMB EGFR NON AFRICAN AMERICAN: 106 ML/MIN/1.73M2
SODIUM SERPL-SCNC: 141 MMOL/L (ref 135–146)
WBC # BLD AUTO: 6.2 THOUSAND/UL (ref 3.8–10.8)

## 2021-11-04 DIAGNOSIS — Z11.1 SCREENING FOR TUBERCULOSIS: Primary | ICD-10-CM

## 2022-05-04 ENCOUNTER — TELEPHONE (OUTPATIENT)
Dept: GASTROENTEROLOGY | Facility: CLINIC | Age: 37
End: 2022-05-04

## 2022-05-04 ENCOUNTER — OFFICE VISIT (OUTPATIENT)
Dept: GASTROENTEROLOGY | Facility: CLINIC | Age: 37
End: 2022-05-04
Payer: COMMERCIAL

## 2022-05-04 VITALS
BODY MASS INDEX: 19.13 KG/M2 | SYSTOLIC BLOOD PRESSURE: 118 MMHG | DIASTOLIC BLOOD PRESSURE: 72 MMHG | HEART RATE: 94 BPM | HEIGHT: 66 IN | WEIGHT: 119 LBS

## 2022-05-04 DIAGNOSIS — K50.00 CROHN'S DISEASE OF SMALL INTESTINE WITHOUT COMPLICATION (HCC): Primary | ICD-10-CM

## 2022-05-04 PROCEDURE — 99214 OFFICE O/P EST MOD 30 MIN: CPT | Performed by: NURSE PRACTITIONER

## 2022-05-04 NOTE — LETTER
May 4, 2022     Shirley Crespo MD  [address]  [address] [place] [address]    Patient: Merritt Shankar   YOB: 1985   Date of Visit: 5/4/2022       Dear Dr Raven Rivera: Thank you for referring Merritt Shankar to me for evaluation  Below are my notes for this consultation  If you have questions, please do not hesitate to call me  I look forward to following your patient along with you  Sincerely,        IVAN Baxter Ra        CC: No Recipients  IVAN Baxter Ra  5/4/2022  5:02 PM  Sign when Signing Visit  [facility] - Outpatient Follow-up Note  Merritt Shankar 29 y o  female MRN: [id number]  Encounter: [id number]    ASSESSMENT AND PLAN:    1  Crohn's disease  Diagnosed in 2011  Most recent colonoscopy 12/2016 -  Normal mucosa in the whole colon however cecal biopsy showed mild chronic activity  Has been well controlled on Remicade since 2020 however she is no w experiencing mild symptoms 1-2 weeks prior to Remicade infusion  Had severe episode of lower abdominal pain in January 2022  Unclear if her symptoms are due to inadequate Remicade levels prior to infusion or due to development of antibodies making Remicade ineffective  -   Check Remicade levels and antibodies  -  Will check routine IBD labs as she has not been seen since 2020 - CBC, CMP, CRP, sed rate  - will check yearly hepatitis-B surface antigen and QuantiFERON  - schedule for colonoscopy at [facility] to evaluate disease activity    Followup Appointment: 2  months  ______________________________________________________________________    Chief Complaint   Patient presents with    Follow up-Crohn's, has had a few flare up's     HPI:  Followup for  Crohn's disease diagnosed 2011  Colonoscopy at that time showed disease at ileum  Most recent colonoscopy was 12/2016 -  Normal mucosa in the whole colon however cecal biopsy showed mild chronic activity        Currently on Remicade  2020  With last 3 Remicade infusion, patient feels flare of symptoms approximately 1 -2 week prior to infusion  Experiences lower abdominal cramping which increases with eating  Had severe flare in January 2022 with severe abdominal pain and needed to significantly restricted her diet for approximately 2 weeks due to abdominal pain  Denies increase stooling or rectal bleeding  She actually reports that she has some constipation with bowel movements every  2-7 days  No melena or rectal bleeding      She has significant stress in her life due to her daughter who recently received a lung transplant  Has lost approximately 10 lb over the past year and reports decreased appetite and poor dietary habits    She is due for infusion this Friday May 6 but had to reschedule until May 11th    She is currently fairly comfortable and denies significant lower abdominal pain        Historical Information   Past Medical History:   Diagnosis Date    Crohn's disease [facility])      Past Surgical History:   Procedure Laterality Date    COLONOSCOPY  12/12/2016    EGD  06/06/2011    INGUINAL HERNIA REPAIR  1988     Social History     Substance and Sexual Activity   Alcohol Use Not Currently    Comment:  very rare social but not currently with pregnancy ( February, 2020)     Social History     Substance and Sexual Activity   Drug Use No     Social History     Tobacco Use   Smoking Status Never Smoker   Smokeless Tobacco Never Used     Family History   Problem Relation Age of Onset    No Known Problems Mother     Hypertension Father     No Known Problems Brother     GI problems Maternal Grandmother     Kidney cancer Maternal Grandfather     No Known Problems Paternal Grandmother     No Known Problems Paternal Grandfather     Colon polyps Neg Hx     Colon cancer Neg Hx          Current Outpatient Medications:     inFLIXimab (REMICADE) 100 mg    Prenatal Vit-Fe Fumarate-FA (PRENATAL 1+1 PO)  No Known Allergies  Reviewed medications and allergies and updated as indicated    PHYSICAL EXAM:    Blood pressure 118/72, pulse 94, height 5' 6" (1 676 m), weight 54 kg (119 lb), unknown if currently breastfeeding  Body mass index is 19 21 kg/m²  General Appearance: NAD, cooperative, alert  Eyes: Anicteric  ENT:  Normocephalic, atraumatic, normal mucosa  Neck:  Supple, symmetrical, trachea midline  Resp:  Clear to auscultation bilaterally; no rales, rhonchi or wheezing; respirations unlabored   CV:  S1 S2, Regular rate and rhythm; no murmur, rub, or gallop  GI:  Soft, non-tender, non-distended; normal bowel sounds; no masses, no organomegaly   Rectal: Deferred  Musculoskeletal: No cyanosis, clubbing or edema  Normal ROM    Skin:  No jaundice, rashes, or lesions   Psych: Normal affect, good eye contact  Neuro: No gross deficits, AAOx3    Lab Results:   Lab Results   Component Value Date    WBC 6 2 02/20/2021    HGB 13 2 02/20/2021    HCT 39 7 02/20/2021    MCV 92 8 02/20/2021     02/20/2021     Lab Results   Component Value Date    K 4 1 02/20/2021     02/20/2021    CO2 27 02/20/2021    BUN 18 02/20/2021    CREATININE 0 74 02/20/2021    CALCIUM 9 3 02/20/2021    AST 11 02/20/2021    ALT 10 02/20/2021    ALKPHOS 46 02/20/2021

## 2022-05-04 NOTE — TELEPHONE ENCOUNTER
Spoke to pt  Transferred to  to schedule appt re: biologic f/u  Patient reports she is starting to flare prior to remicade infusion  She has appt this afternoon with IVAN Guido  Overdue for Hep B Surface antigen and TB Gold  Will also need remicade AB and level done please

## 2022-05-04 NOTE — TELEPHONE ENCOUNTER
Scheduled date of colonoscopy (as of today):6/29/22  Physician performing colonoscopy:Dr Rafita Ford  Location of colonoscopy:[facility]  Bowel prep reviewed with patient:Carrol Pedersen  Instructions reviewed with patient by: Kenneth Purdy   Clearances: No

## 2022-05-04 NOTE — PROGRESS NOTES
[facility] - Outpatient Follow-up Note  Stephon Clark 29 y o  female MRN: [id number]  Encounter: [id number]    ASSESSMENT AND PLAN:    1  Crohn's disease  Diagnosed in 2011  Most recent colonoscopy 12/2016 -  Normal mucosa in the whole colon however cecal biopsy showed mild chronic activity  Has been well controlled on Remicade since 2020 however she is no w experiencing mild symptoms 1-2 weeks prior to Remicade infusion  Had severe episode of lower abdominal pain in January 2022  Unclear if her symptoms are due to inadequate Remicade levels prior to infusion or due to development of antibodies making Remicade ineffective  -   Check Remicade levels and antibodies  - patient will proceed with next Remicade infusion May 11  Will make further decisions regarding  management once Remicade levels and antibodies reviewed and colonoscopy performed to determine current status of Crohn  -  Will check routine IBD labs as she has not been seen since 2020 - CBC, CMP, CRP, sed rate  - will check yearly hepatitis-B surface antigen and QuantiFERON  - schedule for colonoscopy at [facility] to evaluate disease activity    Followup Appointment: 2  months  ______________________________________________________________________    Chief Complaint   Patient presents with    Follow up-Crohn's, has had a few flare up's     HPI:  Followup for  Crohn's disease diagnosed 2011  Colonoscopy at that time showed disease at ileum  Most recent colonoscopy was 12/2016 -  Normal mucosa in the whole colon however cecal biopsy showed mild chronic activity  Currently on Remicade  2020  With last 3 Remicade infusion, patient feels flare of symptoms approximately 1 -2 week prior to infusion  Experiences lower abdominal cramping which increases with eating    Had severe flare in January 2022 with severe abdominal pain and needed to significantly restricted her diet for approximately 2 weeks due to abdominal pain  Denies increase stooling or rectal bleeding  She actually reports that she has some constipation with bowel movements every  2-7 days  No melena or rectal bleeding      She has significant stress in her life due to her daughter who recently received a lung transplant  Has lost approximately 10 lb over the past year and reports decreased appetite and poor dietary habits    She is due for infusion this Friday May 6 but had to reschedule until May 11th  She is currently fairly comfortable and denies significant lower abdominal pain        Historical Information   Past Medical History:   Diagnosis Date    Crohn's disease [facility])      Past Surgical History:   Procedure Laterality Date    COLONOSCOPY  12/12/2016    EGD  06/06/2011    INGUINAL HERNIA REPAIR  1988     Social History     Substance and Sexual Activity   Alcohol Use Not Currently    Comment:  very rare social but not currently with pregnancy ( February, 2020)     Social History     Substance and Sexual Activity   Drug Use No     Social History     Tobacco Use   Smoking Status Never Smoker   Smokeless Tobacco Never Used     Family History   Problem Relation Age of Onset    No Known Problems Mother     Hypertension Father     No Known Problems Brother     GI problems Maternal Grandmother     Kidney cancer Maternal Grandfather     No Known Problems Paternal Grandmother     No Known Problems Paternal Grandfather     Colon polyps Neg Hx     Colon cancer Neg Hx          Current Outpatient Medications:     inFLIXimab (REMICADE) 100 mg    Prenatal Vit-Fe Fumarate-FA (PRENATAL 1+1 PO)  No Known Allergies  Reviewed medications and allergies and updated as indicated    PHYSICAL EXAM:    Blood pressure 118/72, pulse 94, height 5' 6" (1 676 m), weight 54 kg (119 lb), unknown if currently breastfeeding  Body mass index is 19 21 kg/m²    General Appearance: NAD, cooperative, alert  Eyes: Anicteric  ENT:  Normocephalic, atraumatic, normal mucosa  Neck:  Supple, symmetrical, trachea midline  Resp:  Clear to auscultation bilaterally; no rales, rhonchi or wheezing; respirations unlabored   CV:  S1 S2, Regular rate and rhythm; no murmur, rub, or gallop  GI:  Soft, non-tender, non-distended; normal bowel sounds; no masses, no organomegaly   Rectal: Deferred  Musculoskeletal: No cyanosis, clubbing or edema  Normal ROM    Skin:  No jaundice, rashes, or lesions   Psych: Normal affect, good eye contact  Neuro: No gross deficits, AAOx3    Lab Results:   Lab Results   Component Value Date    WBC 6 2 02/20/2021    HGB 13 2 02/20/2021    HCT 39 7 02/20/2021    MCV 92 8 02/20/2021     02/20/2021     Lab Results   Component Value Date    K 4 1 02/20/2021     02/20/2021    CO2 27 02/20/2021    BUN 18 02/20/2021    CREATININE 0 74 02/20/2021    CALCIUM 9 3 02/20/2021    AST 11 02/20/2021    ALT 10 02/20/2021    ALKPHOS 46 02/20/2021

## 2022-05-13 LAB
ALBUMIN SERPL-MCNC: 4.1 G/DL (ref 3.6–5.1)
ALBUMIN/GLOB SERPL: 1.6 (CALC) (ref 1–2.5)
ALP SERPL-CCNC: 56 U/L (ref 31–125)
ALT SERPL-CCNC: 8 U/L (ref 6–29)
AST SERPL-CCNC: 13 U/L (ref 10–30)
BILIRUB SERPL-MCNC: 0.6 MG/DL (ref 0.2–1.2)
BUN SERPL-MCNC: 11 MG/DL (ref 7–25)
BUN/CREAT SERPL: NORMAL (CALC) (ref 6–22)
CALCIUM SERPL-MCNC: 9.2 MG/DL (ref 8.6–10.2)
CHLORIDE SERPL-SCNC: 106 MMOL/L (ref 98–110)
CO2 SERPL-SCNC: 25 MMOL/L (ref 20–32)
CREAT SERPL-MCNC: 0.77 MG/DL (ref 0.5–1.1)
CRP SERPL-MCNC: 1.6 MG/L
ERYTHROCYTE [DISTWIDTH] IN BLOOD BY AUTOMATED COUNT: 12.2 % (ref 11–15)
GLOBULIN SER CALC-MCNC: 2.6 G/DL (CALC) (ref 1.9–3.7)
GLUCOSE SERPL-MCNC: 127 MG/DL (ref 65–139)
HBV SURFACE AG SERPL QL IA: NORMAL
HBV SURFACE AG SERPL QL NT: NORMAL
HCT VFR BLD AUTO: 38.9 % (ref 35–45)
HGB BLD-MCNC: 13 G/DL (ref 11.7–15.5)
MCH RBC QN AUTO: 30.4 PG (ref 27–33)
MCHC RBC AUTO-ENTMCNC: 33.4 G/DL (ref 32–36)
MCV RBC AUTO: 91.1 FL (ref 80–100)
PLATELET # BLD AUTO: 188 THOUSAND/UL (ref 140–400)
PMV BLD REES-ECKER: 12.9 FL (ref 7.5–12.5)
POTASSIUM SERPL-SCNC: 4 MMOL/L (ref 3.5–5.3)
PROT SERPL-MCNC: 6.7 G/DL (ref 6.1–8.1)
RBC # BLD AUTO: 4.27 MILLION/UL (ref 3.8–5.1)
SL AMB EGFR AFRICAN AMERICAN: 116 ML/MIN/1.73M2
SL AMB EGFR NON AFRICAN AMERICAN: 100 ML/MIN/1.73M2
SODIUM SERPL-SCNC: 139 MMOL/L (ref 135–146)
WBC # BLD AUTO: 8.4 THOUSAND/UL (ref 3.8–10.8)

## 2022-05-26 ENCOUNTER — TELEPHONE (OUTPATIENT)
Dept: GASTROENTEROLOGY | Facility: CLINIC | Age: 37
End: 2022-05-26

## 2022-05-26 NOTE — TELEPHONE ENCOUNTER
Zainab from [facility] called advising that [facility] never processed patients Quant Tb Gold & HBsAg  She reported the incident to [facility] as the "refrigerated specimens" were left behind and never processed over the weekend  I spoke with patient  Her last Infusion was around  5/11/22; she will be due again 7/6/22  She will plan to go for labs 1 week prior ( last week in June); she plans to go back to [facility] to have drawn  CBC, CMP & CRP stable  Faxed to [address] to be scanned into chart for review

## 2022-06-28 ENCOUNTER — ANESTHESIA (OUTPATIENT)
Dept: ANESTHESIOLOGY | Facility: AMBULATORY SURGERY CENTER | Age: 37
End: 2022-06-28

## 2022-06-28 ENCOUNTER — ANESTHESIA EVENT (OUTPATIENT)
Dept: ANESTHESIOLOGY | Facility: AMBULATORY SURGERY CENTER | Age: 37
End: 2022-06-28

## 2022-06-28 NOTE — ANESTHESIA PREPROCEDURE EVALUATION
Procedure:  PRE-OP ONLY    Relevant Problems   Digestive   (+) Crohn's disease of both small and large intestine without complication ([facility] [address] )      Other   (+) History of transfusion             Anesthesia Plan  ASA Score- 2     Anesthesia Type- IV sedation with anesthesia with ASA Monitors  Additional Monitors:   Airway Plan:           Plan Factors-    Chart reviewed  Patient is not a current smoker  Induction- intravenous  Postoperative Plan-     Informed Consent- Anesthetic plan and risks discussed with patient  I personally reviewed this patient with the CRNA  Discussed and agreed on the Anesthesia Plan with the YUMIKO Masters

## 2022-06-29 ENCOUNTER — HOSPITAL ENCOUNTER (OUTPATIENT)
Dept: GASTROENTEROLOGY | Facility: AMBULATORY SURGERY CENTER | Age: 37
Discharge: HOME | End: 2022-06-29
Payer: COMMERCIAL

## 2022-06-29 ENCOUNTER — ANESTHESIA EVENT (OUTPATIENT)
Dept: GASTROENTEROLOGY | Facility: AMBULATORY SURGERY CENTER | Age: 37
End: 2022-06-29

## 2022-06-29 ENCOUNTER — ANESTHESIA (OUTPATIENT)
Dept: GASTROENTEROLOGY | Facility: AMBULATORY SURGERY CENTER | Age: 37
End: 2022-06-29

## 2022-06-29 VITALS
OXYGEN SATURATION: 98 % | SYSTOLIC BLOOD PRESSURE: 112 MMHG | WEIGHT: 115 LBS | DIASTOLIC BLOOD PRESSURE: 58 MMHG | HEART RATE: 88 BPM | TEMPERATURE: 98.7 F | HEIGHT: 66 IN | BODY MASS INDEX: 18.48 KG/M2 | RESPIRATION RATE: 19 BRPM

## 2022-06-29 DIAGNOSIS — K50.00 CROHN'S DISEASE OF SMALL INTESTINE WITHOUT COMPLICATION (HCC): ICD-10-CM

## 2022-06-29 LAB
EXT PREGNANCY TEST URINE: NEGATIVE
EXT. CONTROL: NORMAL

## 2022-06-29 PROCEDURE — 45380 COLONOSCOPY AND BIOPSY: CPT | Performed by: INTERNAL MEDICINE

## 2022-06-29 PROCEDURE — 88305 TISSUE EXAM BY PATHOLOGIST: CPT | Performed by: PATHOLOGY

## 2022-06-29 RX ORDER — PROPOFOL 10 MG/ML
INJECTION, EMULSION INTRAVENOUS AS NEEDED
Status: DISCONTINUED | OUTPATIENT
Start: 2022-06-29 | End: 2022-06-29

## 2022-06-29 RX ORDER — SODIUM CHLORIDE, SODIUM LACTATE, POTASSIUM CHLORIDE, CALCIUM CHLORIDE 600; 310; 30; 20 MG/100ML; MG/100ML; MG/100ML; MG/100ML
INJECTION, SOLUTION INTRAVENOUS CONTINUOUS PRN
Status: DISCONTINUED | OUTPATIENT
Start: 2022-06-29 | End: 2022-06-29

## 2022-06-29 RX ORDER — LIDOCAINE HYDROCHLORIDE 10 MG/ML
INJECTION, SOLUTION EPIDURAL; INFILTRATION; INTRACAUDAL; PERINEURAL AS NEEDED
Status: DISCONTINUED | OUTPATIENT
Start: 2022-06-29 | End: 2022-06-29

## 2022-06-29 RX ORDER — SODIUM CHLORIDE, SODIUM LACTATE, POTASSIUM CHLORIDE, CALCIUM CHLORIDE 600; 310; 30; 20 MG/100ML; MG/100ML; MG/100ML; MG/100ML
50 INJECTION, SOLUTION INTRAVENOUS CONTINUOUS
Status: DISCONTINUED | OUTPATIENT
Start: 2022-06-29 | End: 2022-07-03 | Stop reason: HOSPADM

## 2022-06-29 RX ADMIN — SODIUM CHLORIDE, SODIUM LACTATE, POTASSIUM CHLORIDE, CALCIUM CHLORIDE: 600; 310; 30; 20 INJECTION, SOLUTION INTRAVENOUS at 12:07

## 2022-06-29 RX ADMIN — PROPOFOL 50 MG: 10 INJECTION, EMULSION INTRAVENOUS at 12:25

## 2022-06-29 RX ADMIN — SODIUM CHLORIDE, SODIUM LACTATE, POTASSIUM CHLORIDE, CALCIUM CHLORIDE 50 ML/HR: 600; 310; 30; 20 INJECTION, SOLUTION INTRAVENOUS at 11:24

## 2022-06-29 RX ADMIN — PROPOFOL 50 MG: 10 INJECTION, EMULSION INTRAVENOUS at 12:18

## 2022-06-29 RX ADMIN — PROPOFOL 50 MG: 10 INJECTION, EMULSION INTRAVENOUS at 12:21

## 2022-06-29 RX ADMIN — PROPOFOL 100 MG: 10 INJECTION, EMULSION INTRAVENOUS at 12:11

## 2022-06-29 RX ADMIN — PROPOFOL 50 MG: 10 INJECTION, EMULSION INTRAVENOUS at 12:14

## 2022-06-29 RX ADMIN — LIDOCAINE HYDROCHLORIDE 30 MG: 10 INJECTION, SOLUTION EPIDURAL; INFILTRATION; INTRACAUDAL; PERINEURAL at 12:11

## 2022-06-29 NOTE — ANESTHESIA PREPROCEDURE EVALUATION
Procedure:  COLONOSCOPY    Relevant Problems   No relevant active problems      Crohn's disease ([address] ) last [place] 2/2022 Resolved  No hospitalization needed  Physical Exam    Airway    Mallampati score: I  TM Distance: >3 FB  Neck ROM: full     Dental   No notable dental hx     Cardiovascular      Pulmonary      Other Findings        Anesthesia Plan  ASA Score- 2     Anesthesia Type- IV sedation with anesthesia with ASA Monitors  Additional Monitors:   Airway Plan:           Plan Factors-Exercise tolerance (METS): >4 METS  Chart reviewed  Patient is not a current smoker  Induction- intravenous  Postoperative Plan-     Informed Consent- Anesthetic plan and risks discussed with patient  I personally reviewed this patient with the CRNA  Discussed and agreed on the Anesthesia Plan with the CRNA  Solitario Dave

## 2022-06-29 NOTE — ANESTHESIA POSTPROCEDURE EVALUATION
Post-Op Assessment Note    CV Status:  Stable  Pain Score: 0    Pain management: adequate     Mental Status:  Alert and awake   Hydration Status:  Euvolemic   PONV Controlled:  Controlled   Airway Patency:  Patent      Post Op Vitals Reviewed: Yes      Staff: Anesthesiologist, CRNA         No complications documented      BP   94/56   Temp      Pulse  79   Resp   18   SpO2   100

## 2022-06-29 NOTE — H&P
History and Physical -  [facility]  Samra England 40 y o  female MRN: [id number]    HPI: Samra England is a 40y o  year old female who presents for colonoscopy for history of Crohn's disease    REVIEW OF SYSTEMS: Per the HPI, and otherwise unremarkable  Historical Information   Past Medical History:   Diagnosis Date    Crohn's disease [facility])      Past Surgical History:   Procedure Laterality Date    COLONOSCOPY  12/12/2016    COLONOSCOPY      EGD  06/06/2011    INGUINAL HERNIA REPAIR  1988     Social History   Social History     Substance and Sexual Activity   Alcohol Use Not Currently     Social History     Substance and Sexual Activity   Drug Use No     Social History     Tobacco Use   Smoking Status Never Smoker   Smokeless Tobacco Never Used     Family History   Problem Relation Age of Onset    No Known Problems Mother     Hypertension Father     No Known Problems Brother     GI problems Maternal Grandmother     Kidney cancer Maternal Grandfather     No Known Problems Paternal Grandmother     No Known Problems Paternal Grandfather     Colon polyps Neg Hx     Colon cancer Neg Hx        Meds/Allergies       Current Outpatient Medications:     Prenatal Vit-Fe Fumarate-FA (PRENATAL 1+1 PO)    inFLIXimab (REMICADE) 100 mg    Current Facility-Administered Medications:     lactated ringers infusion, 50 mL/hr, Intravenous, Continuous, 50 mL/hr at 06/29/22 1124    No Known Allergies    Objective     /61   Pulse 87   Temp 98 7 °F (37 1 °C) (Temporal)   Resp 20   Ht 5' 6" (1 676 m)   Wt 52 2 kg (115 lb)   LMP 06/06/2022   SpO2 99%   BMI 18 56 kg/m²     PHYSICAL EXAM    Gen: NAD AAOx3  Head: Normocephalic, Atraumatic  CV: S1S2 RRR no m/r/g  CHEST: Clear b/l no c/r/w  ABD: soft, +BS NT/ND  EXT: no edema    ASSESSMENT/PLAN:  This is a 40y o  year old female here for colonoscopy, and she is stable and optimized for her procedure

## 2022-07-01 ENCOUNTER — TELEPHONE (OUTPATIENT)
Dept: GASTROENTEROLOGY | Facility: CLINIC | Age: 37
End: 2022-07-01

## 2022-07-01 DIAGNOSIS — Z11.1 SCREENING FOR TUBERCULOSIS: ICD-10-CM

## 2022-07-01 DIAGNOSIS — K50.80 CROHN'S DISEASE OF BOTH SMALL AND LARGE INTESTINE WITHOUT COMPLICATION (HCC): Primary | ICD-10-CM

## 2022-07-01 DIAGNOSIS — Z79.899 LONG TERM CURRENT USE OF IMMUNOSUPPRESSIVE DRUG: ICD-10-CM

## 2022-07-01 NOTE — TELEPHONE ENCOUNTER
Updated orders for Remicade Rapid Infusion along with Quant Tb Gold & Remicade drug and antibody levels for [facility] faxed to [facility] Fax: [phone]     Remicade Infuse 300 mg into a venous catheter Every 8 weeks, administered at [address] submitted (medical insurance) to: [address]  Date Submitted:7/19/2021  How prior auth submitted (done by Infusion Site): [facility]  Date Range of Authorization:7/14/2021-7/14/2023    Infusion Site Information:  [facility]  [address] [address]  Phone: [phone] Fax: [address]   Patient    [email or web address]  Office: [phone]   Fax- [phone]       Is the med buy/bill? YES    Last TB Gold date, recall in place?: TO BE RE-DRAWN 7/11/22 INFUSION  Last Hep B Surface Antigen date, recall in place? 5/11/2022    Last OV Date/Provider: 5/4/2022  Next OV expected/scheduled date/provider: 8/5/2022  Patient also consulted with Dr Kriss Thakur in the past during her pregnancies  Last Drug level/Drug Antibody Level date: To be drawn 7/11/22 at [facility] History: Diagnosed with Crohn's 2011   Started Remicade in 2020 7/1/22 New Remicade order scanned under the media tab in Epic (order faxed to infusion center) and prescription added to medication list in EPIC    Last Date Biologic Tracker updated:  7/1/22

## 2022-07-13 LAB
M TB IFN-G CD4+ BCKGRND COR BLD-ACNC: 0 IU/ML
M TB IFN-G CD4+ BCKGRND COR BLD-ACNC: 0 IU/ML
M TB IFN-G CD4+ T-CELLS BLD-ACNC: 0.02 IU/ML
M TB TUBERC IFN-G BLD QL: NEGATIVE
M TB TUBERC IGNF/MITOGEN IGNF CONTROL: >10 IU/ML

## 2022-09-15 ENCOUNTER — OFFICE VISIT (OUTPATIENT)
Dept: GASTROENTEROLOGY | Facility: CLINIC | Age: 37
End: 2022-09-15
Payer: COMMERCIAL

## 2022-09-15 VITALS
DIASTOLIC BLOOD PRESSURE: 58 MMHG | HEIGHT: 66 IN | BODY MASS INDEX: 19.44 KG/M2 | WEIGHT: 121 LBS | SYSTOLIC BLOOD PRESSURE: 98 MMHG

## 2022-09-15 DIAGNOSIS — K50.80 CROHN'S DISEASE OF BOTH SMALL AND LARGE INTESTINE WITHOUT COMPLICATION (HCC): Primary | ICD-10-CM

## 2022-09-15 PROCEDURE — 99214 OFFICE O/P EST MOD 30 MIN: CPT | Performed by: NURSE PRACTITIONER

## 2022-09-15 NOTE — LETTER
September 15, 2022     Lisa Womack MD  [address] [address]  [address] [place] [address]    Patient: Carlos Mejia   YOB: 1985   Date of Visit: 9/15/2022       Dear Dr Pop Knowles: Thank you for referring Carlos Mejia to me for evaluation  Below are my notes for this consultation  If you have questions, please do not hesitate to call me  I look forward to following your patient along with you  Sincerely,        IVAN Ruiz        CC: No Recipients  IVAN Ruiz  9/15/2022  5:25 PM  Sign when Signing Visit  [address] [facility] - Outpatient Follow-up Note  Carlos Mejia 40 y o  female MRN: [id number]  Encounter: [id number]    ASSESSMENT AND PLAN:    1  Crohn's disease   Diagnosed 2011  Colonoscopy in 2016 showed  mild right colon activity at cecum  Started on Remicade 2018 during pregnancy and symptoms have been well controlled on Remicade infusions every 2 months since then  Recent flare January and May 2022 -symptoms resolved with dietary changes and stress management  Colonoscopy 6/29/22 was negative for mucosal or microscopic activity  Infliximab  Levels and antibodies checked prior to July infusion- levels adequate however noted to have increased antibodies at 100  Last dose of Remicade 9/7/22  Discussed with Dr Clay Romero -  Consider switching to different TNF- alpha inhibitor  Will refer to Dr Terese Mosquera, our IBD specialist for further evaluation and management  - needs referral for eye exam  - needs referral for Dermatology/skin check    Followup Appointment:  Follow-up with Dr Terese Mosquera at [facility]  ______________________________________________________________________    Chief Complaint   Patient presents with    Follow-up     HPI: Presents in follow-up today for history of Crohn's which was diagnosed in her 20's    Colonoscopy 2016 showed mild disease activity at cecum with erythema and ulceration        Patient reports EGD many years ago was within normal limits  She has never had small bowel evaluation    Followed by [facility] Neva Guerrero at [facility] for management during pregnancy in 2018 and started on Remicade which was maintained throughout her pregnancy without complications  She has been maintained on Remicade every 8 weeks since then   Last infusion was last week 9/7, next infusion due November 2nd, 2022    At her last office visit in May she was experiencing lower abdominal pain which increased after eating   Also reports having a severe flare in January 2022  She did undergo colonoscopy 06/29/2022 due to recent flare which showed normal mucosa throughout colon  Random colonic biopsy was negative for active inflammation    Infliximab levels and antidrug antibody were drawn on 07/11 just prior to infusion  Levels were adequate however antibodies found to be greater than 100    Patient feels that her  Flares in January and May of this year were related to stress and poor diet  Her symptoms resolved with restricting her diet and eating very bland foods for 1 week  She has had no further abdominal pain or cramping  No change in her bowel habits  Normal pattern is formed bowel movement every 2-3 days and occasional large watery bowel movement  She denies any mucousy or bloody bowel movements      Her appetite is good and her weight has been stable    Continues on Remicade infusions every 8 weeks  Reports previous nonresponse to Cimzia, Imuran and Lialda    Historical Information   Past Medical History:   Diagnosis Date    Crohn's disease [facility])      Past Surgical History:   Procedure Laterality Date    COLONOSCOPY  12/12/2016    COLONOSCOPY      EGD  06/06/2011    INGUINAL HERNIA REPAIR  1988     Social History     Substance and Sexual Activity   Alcohol Use Not Currently     Social History     Substance and Sexual Activity   Drug Use No     Social History     Tobacco Use   Smoking Status Never Smoker Smokeless Tobacco Never Used     Family History   Problem Relation Age of Onset    No Known Problems Mother     Hypertension Father     No Known Problems Brother     GI problems Maternal Grandmother     Kidney cancer Maternal Grandfather     No Known Problems Paternal Grandmother     No Known Problems Paternal Grandfather     Colon polyps Neg Hx     Colon cancer Neg Hx          Current Outpatient Medications:     inFLIXimab (REMICADE) 100 mg    Prenatal Vit-Fe Fumarate-FA (PRENATAL 1+1 PO)  No Known Allergies  Reviewed medications and allergies and updated as indicated    PHYSICAL EXAM:    Blood pressure 98/58, height 5' 6" (1 676 m), weight 54 9 kg (121 lb), unknown if currently breastfeeding  Body mass index is 19 53 kg/m²  General Appearance: NAD, cooperative, alert  Eyes: Anicteric  ENT:  Normocephalic, atraumatic, normal mucosa  Neck:  Supple, symmetrical, trachea midline  Resp:  Clear to auscultation bilaterally; no rales, rhonchi or wheezing; respirations unlabored   CV:  S1 S2, Regular rate and rhythm; no murmur, rub, or gallop  GI:  Soft, non-tender, non-distended; normal bowel sounds; no masses, no organomegaly   Rectal: Deferred  Musculoskeletal: No cyanosis, clubbing or edema  Normal ROM    Skin:  No jaundice, rashes, or lesions   Psych: Normal affect, good eye contact  Neuro: No gross deficits, AAOx3    Lab Results:   Lab Results   Component Value Date    WBC 8 4 05/11/2022    HGB 13 0 05/11/2022    HCT 38 9 05/11/2022    MCV 91 1 05/11/2022     05/11/2022     Lab Results   Component Value Date    K 4 0 05/11/2022     05/11/2022    CO2 25 05/11/2022    BUN 11 05/11/2022    CREATININE 0 77 05/11/2022    CALCIUM 9 2 05/11/2022    AST 13 05/11/2022    ALT 8 05/11/2022    ALKPHOS 56 05/11/2022

## 2022-09-15 NOTE — PROGRESS NOTES
[address] [facility] - Outpatient Follow-up Note  Jose Kaufman 40 y o  female MRN: [id number]  Encounter: [id number]    ASSESSMENT AND PLAN:    1  Crohn's disease   Diagnosed 2011  Colonoscopy in 2016 showed  mild right colon activity at cecum  Started on Remicade 2018 during pregnancy and symptoms have been well controlled on Remicade infusions every 2 months since then  Recent flare January and May 2022 -symptoms resolved with dietary changes and stress management  Colonoscopy 6/29/22 was negative for mucosal or microscopic activity  Infliximab  Levels and antibodies checked prior to July infusion- levels adequate however noted to have increased antibodies at 100  Last dose of Remicade 9/7/22  Discussed with Dr Michael Norris -  Consider switching to different TNF- alpha inhibitor  Will refer to Dr Haven Nichols, our IBD specialist for further evaluation and management  - needs referral for eye exam  - needs referral for Dermatology/skin check    Followup Appointment:  Follow-up with Dr Haven Nichols at [facility]  ______________________________________________________________________    Chief Complaint   Patient presents with    Follow-up     HPI: Presents in follow-up today for history of Crohn's which was diagnosed in her 20's    Colonoscopy 2016 showed mild disease activity at cecum with erythema and ulceration  Patient reports EGD many years ago was within normal limits  She has never had small bowel evaluation    Followed by Dr Pilar Cerda at [facility] for management during pregnancy in 2018 and started on Remicade which was maintained throughout her pregnancy without complications  She has been maintained on Remicade every 8 weeks since then   Last infusion was last week 9/7, next infusion due November 2nd, 2022    At her last office visit in May she was experiencing lower abdominal pain which increased after eating    Also reports having a severe flare in January 2022   She did undergo colonoscopy 06/29/2022 due to recent flare which showed normal mucosa throughout colon  Random colonic biopsy was negative for active inflammation    Infliximab levels and antidrug antibody were drawn on 07/11 just prior to infusion  Levels were adequate however antibodies found to be greater than 100    Patient feels that her  Flares in January and May of this year were related to stress and poor diet  Her symptoms resolved with restricting her diet and eating very bland foods for 1 week  She has had no further abdominal pain or cramping  No change in her bowel habits  Normal pattern is formed bowel movement every 2-3 days and occasional large watery bowel movement  She denies any mucousy or bloody bowel movements      Her appetite is good and her weight has been stable    Continues on Remicade infusions every 8 weeks  Reports previous nonresponse to Cimzia, Imuran and Lialda    Historical Information   Past Medical History:   Diagnosis Date    Crohn's disease [facility])      Past Surgical History:   Procedure Laterality Date    COLONOSCOPY  12/12/2016    COLONOSCOPY      EGD  06/06/2011    INGUINAL HERNIA REPAIR  1988     Social History     Substance and Sexual Activity   Alcohol Use Not Currently     Social History     Substance and Sexual Activity   Drug Use No     Social History     Tobacco Use   Smoking Status Never Smoker   Smokeless Tobacco Never Used     Family History   Problem Relation Age of Onset    No Known Problems Mother     Hypertension Father     No Known Problems Brother     GI problems Maternal Grandmother     Kidney cancer Maternal Grandfather     No Known Problems Paternal Grandmother     No Known Problems Paternal Grandfather     Colon polyps Neg Hx     Colon cancer Neg Hx          Current Outpatient Medications:     inFLIXimab (REMICADE) 100 mg    Prenatal Vit-Fe Fumarate-FA (PRENATAL 1+1 PO)  No Known Allergies  Reviewed medications and allergies and updated as indicated    PHYSICAL EXAM:    Blood pressure 98/58, height 5' 6" (1 676 m), weight 54 9 kg (121 lb), unknown if currently breastfeeding  Body mass index is 19 53 kg/m²  General Appearance: NAD, cooperative, alert  Eyes: Anicteric  ENT:  Normocephalic, atraumatic, normal mucosa  Neck:  Supple, symmetrical, trachea midline  Resp:  Clear to auscultation bilaterally; no rales, rhonchi or wheezing; respirations unlabored   CV:  S1 S2, Regular rate and rhythm; no murmur, rub, or gallop  GI:  Soft, non-tender, non-distended; normal bowel sounds; no masses, no organomegaly   Rectal: Deferred  Musculoskeletal: No cyanosis, clubbing or edema  Normal ROM    Skin:  No jaundice, rashes, or lesions   Psych: Normal affect, good eye contact  Neuro: No gross deficits, AAOx3    Lab Results:   Lab Results   Component Value Date    WBC 8 4 05/11/2022    HGB 13 0 05/11/2022    HCT 38 9 05/11/2022    MCV 91 1 05/11/2022     05/11/2022     Lab Results   Component Value Date    K 4 0 05/11/2022     05/11/2022    CO2 25 05/11/2022    BUN 11 05/11/2022    CREATININE 0 77 05/11/2022    CALCIUM 9 2 05/11/2022    AST 13 05/11/2022    ALT 8 05/11/2022    ALKPHOS 56 05/11/2022

## 2022-09-16 ENCOUNTER — TELEPHONE (OUTPATIENT)
Dept: GASTROENTEROLOGY | Facility: CLINIC | Age: 37
End: 2022-09-16

## 2022-09-28 ENCOUNTER — OFFICE VISIT (OUTPATIENT)
Dept: GASTROENTEROLOGY | Facility: CLINIC | Age: 37
End: 2022-09-28
Payer: COMMERCIAL

## 2022-09-28 ENCOUNTER — TELEPHONE (OUTPATIENT)
Dept: GASTROENTEROLOGY | Facility: CLINIC | Age: 37
End: 2022-09-28

## 2022-09-28 VITALS
HEIGHT: 66 IN | SYSTOLIC BLOOD PRESSURE: 112 MMHG | HEART RATE: 75 BPM | DIASTOLIC BLOOD PRESSURE: 60 MMHG | BODY MASS INDEX: 19.46 KG/M2 | WEIGHT: 121.1 LBS | TEMPERATURE: 97.7 F | OXYGEN SATURATION: 100 %

## 2022-09-28 DIAGNOSIS — K50.80 CROHN'S DISEASE OF BOTH SMALL AND LARGE INTESTINE WITHOUT COMPLICATION (HCC): Primary | ICD-10-CM

## 2022-09-28 DIAGNOSIS — D84.9 IMMUNOCOMPROMISED PATIENT (HCC): ICD-10-CM

## 2022-09-28 DIAGNOSIS — Z79.899 LONG TERM CURRENT USE OF IMMUNOSUPPRESSIVE DRUG: ICD-10-CM

## 2022-09-28 PROCEDURE — 99214 OFFICE O/P EST MOD 30 MIN: CPT | Performed by: INTERNAL MEDICINE

## 2022-09-28 RX ORDER — INFLIXIMAB 100 MG/10ML
INJECTION, POWDER, LYOPHILIZED, FOR SOLUTION INTRAVENOUS
Qty: 1 EACH | Refills: 8 | Status: SHIPPED | OUTPATIENT
Start: 2022-09-28

## 2022-09-28 NOTE — TELEPHONE ENCOUNTER
Emailed Melchor Amin to ask what they all needed from me in order to get this patient approved and set up for every 6 week dosing

## 2022-09-28 NOTE — PROGRESS NOTES
[facility] - Outpatient Follow-up Note  Vinicius Peters 40 y o  female MRN: [id number]  Encounter: [id number]          ASSESSMENT AND PLAN:    Vinicius Peters is a 40 y o  female who presents Crohn's disease diagnosed approximately 10 years ago, previously on Lialda, Imuran, Cimzia, now on Remicade since approximately 2017 and doing well since then with infusions approximately every 2 months, however she then had flare in February 2022 with symptoms resolved with dietary changes in stress management  Most recent colonoscopy in June negative for endoscopic or microscopic activity  CMP normal, CBC normal   CRP normal at 1 6  QuantiFERON gold negative in July  Infliximab level 2 7 and antibodies greater than 100      1  Crohn's disease of both small and large intestine without complication ([address] )    2  Long term current use of immunosuppressive drug    3  Immunocompromised patient [facility])        Orders Placed This Encounter   Procedures    MRI enterography w wo    C-reactive protein    TPMT GENOTYPING,BLOOD     Check TPMT  Depending on the results will likely start Imuran at 50 mg daily  Increase Remicade frequency to every 6 week  Repeat infliximab level and antidrug antibody in approximately 3-4 months  MR enterography to complete small bowel assessment for Crohn's  Next QuantiFERON gold and hepatitis panel July 2023  Avoid live virus vaccines  Yearly flu shot  COVID vaccine and booster  Pneumonia vaccine  Shingrix  Routine skin exams with the dermatologist    ______________________________________________________________________    SUBJECTIVE:    Vinicius Peters is a 40 y o  female who presents with complaint of Crohn's disease diagnosed in 2011 presents for 2nd opinion  She was diagnosed with Crohn's 10 years ago  She had stomach pain and cramps when she ate  Saw PCP and underwent testing  She saw a GI physician and she was referred to [facility]   She saw Dr Nitish Isaac and underwent EGD and colonoscopy  She was told she had ileal inflammation  She was started on Lialda but no benefit  She saw Dr Soham Young and she was started on imuran  It helped a lot  She wanted to have children so she stopped it  She then switched to Cimzia and it worked for 9 months but then it stopped  She then switched to Remicade around 2017  Since then she felt fine  She had 2 flares when she got pregnant  She managed it with diet (bland diet) and then she reintroduced foods and felt well  1 year ago she noticed that she could tell when she needed her next remicade because 1 week before she would get symptoms  Most recently she felt like she was getting a flare in February  Did the diet again and she felt fine since  She had a colonosocpy in June and she was told she was fine  No symptoms since February  She can have 2-3 days without a BM, or even 1 week, and then diarrhea to release all the stool  Now that she is eating 2 meals a day she has a BM every 2-3 days, soft but formed  No bright red blood per rectum/rectal bleeding, no melena  Some urgency (2-5 minutes), no nocturnal BMs, no incontinence  No abdominal pain  no rashes, no mouth sores, no joint pains (she had some in the past few months, hands, but not in the past 2 weeks)  No heartburn  Sometimes she feels like she forgets how to swallow but once down it will pass  Maybe a few times a month  No esophageal dysphagia, odynophagia, nausea, vomiting  No recent weight loss       Answers for HPI/ROS submitted by the patient on 9/28/2022  When you are not experiencing symptoms of your inflammatory bowel disease, how many bowel movements do you typically have each day?: 0-1  What is the average (typical) number of bowel movements that you had in a single day during the last week?: 2  Over the last 3 days, have you had any bowel movements where you passed blood without stool?: No  Since your last visit, have you received any vaccinations?: No  Since the last visit, have you had an infection?: No  Is there any possibility that you may be pregnant?: No  In the past three months, have you used tobacco in any form?: No  During the last year, how many days have you missed work or school because of your inflammatory bowel disease?: 0  During the last year, how many days have you been hospitalized because of your inflammatory bowel disease?: 0  During the last year, how many days have you visited a hospital emergency department because of your inflammatory bowel disease?: 0  During the last month, have you taken narcotic pain medications (such as Percocet, oxycodone, Oxycontin, morphine, Vicodin, Dilaudid, MS Contin) for your inflammatory bowel disease?: No  Have you awoken at night because you needed to move your bowels during the last month? : No  Have you had leakage of stool while sleeping during the last month?: No  Have you had leakage of stool while you were awake during the last month?: No  In the last 6 months, have you unintentionally lost weight?: Yes  Fever: No  Eye irritation: No  Mouth sores: No  Sore throat: No  Chest pain: No  Shortness of breath: No  Numbness or tingling in your hands or feet: Yes  Skin rash: No  Pain or swelling in your joints: No  Bruising or bleeding: No  Felt depressed or blue: No        REVIEW OF SYSTEMS IS OTHERWISE NEGATIVE    10 point ROS reviewed and negative, except as above      Historical Information   Past Medical History:   Diagnosis Date    Crohn's disease [facility])      Past Surgical History:   Procedure Laterality Date    COLONOSCOPY  12/12/2016    COLONOSCOPY      EGD  06/06/2011    INGUINAL HERNIA REPAIR  1988     Social History   Social History     Substance and Sexual Activity   Alcohol Use Not Currently     Social History     Substance and Sexual Activity   Drug Use No     Social History     Tobacco Use   Smoking Status Never Smoker   Smokeless Tobacco Never Used     Family History   Problem Relation Age of Onset    No Known Problems Mother     Hypertension Father     No Known Problems Brother     GI problems Maternal Grandmother     Kidney cancer Maternal Grandfather     No Known Problems Paternal Grandmother     No Known Problems Paternal Grandfather     Colon polyps Neg Hx     Colon cancer Neg Hx        Meds/Allergies       Current Outpatient Medications:     inFLIXimab (REMICADE) 100 mg    Prenatal Vit-Fe Fumarate-FA (PRENATAL 1+1 PO)    No Known Allergies        Objective     Blood pressure 112/60, pulse 75, temperature 97 7 °F (36 5 °C), temperature source Tympanic, height 5' 6" (1 676 m), weight 54 9 kg (121 lb 1 6 oz), SpO2 100 %, unknown if currently breastfeeding  Body mass index is 19 55 kg/m²  PHYSICAL EXAMINATION:    General Appearance:   Alert, cooperative, no distress   HEENT:  Normocephalic, atraumatic, anicteric  Neck supple, symmetrical, trachea midline  Lungs:   Equal chest rise and unlabored breathing, normal effort, no coughing  Cardiovascular:   No visualized JVD  Abdomen:   No abdominal distension  Skin:   No jaundice, rashes, or lesions  Musculoskeletal:   Normal range of motion visualized  Psych:  Normal affect and normal insight  Neuro:  Alert and appropriate  Lab Results:   No visits with results within 1 Day(s) from this visit     Latest known visit with results is:   Telephone on 07/01/2022   Component Date Value    [facility] Quantiferon(R)-TB * 07/11/2022 NEGATIVE     NIL 07/11/2022 0 02     Mitogen-NIL 07/11/2022 >10 00     TB1-NIL 07/11/2022 0 00     TB2-NIL 07/11/2022 0 00        Lab Results   Component Value Date    WBC 8 4 05/11/2022    HGB 13 0 05/11/2022    HCT 38 9 05/11/2022    MCV 91 1 05/11/2022     05/11/2022       Lab Results   Component Value Date    SODIUM 139 05/11/2022    K 4 0 05/11/2022     05/11/2022    CO2 25 05/11/2022    BUN 11 05/11/2022    CREATININE 0 77 05/11/2022    GLUC 127 05/11/2022    CALCIUM 9 2 05/11/2022    AST 13 05/11/2022 ALT 8 05/11/2022    ALKPHOS 56 05/11/2022    TP 6 7 05/11/2022    TBILI 0 6 05/11/2022       Lab Results   Component Value Date    CRP 1 6 05/11/2022       No results found for: [id number], TSH    No results found for: IRON, TIBC, FERRITIN    Radiology Results:   No results found

## 2022-09-28 NOTE — TELEPHONE ENCOUNTER
Hi,    Can we get her Remicade increased to every 6 weeks? She infuses at [facility]  I printed the script  Thank you!

## 2022-09-28 NOTE — TELEPHONE ENCOUNTER
[facility] will need their referral form completed along with clinical notes  Form completed and signed  Faxed form and clinicals to [phone]   Sent follow up email as well

## 2022-09-28 NOTE — Clinical Note
Hi,  Thank you for referring Marissa Ibrahimarose  We will likely start Imuran and also increase the Remicade frequency to every 6 weeks to try and raise the level, as well as decrease the antibodies  We will repeat a Remicade level with anti drug antibodies in approximately 3-4 months  I also ordered an MR enterography for small-bowel assessment because I think it has been sometime since she had one  Hope you are having a great day!   Sincerely, Bernie Levine

## 2022-11-09 ENCOUNTER — HOSPITAL ENCOUNTER (OUTPATIENT)
Dept: MRI IMAGING | Facility: HOSPITAL | Age: 37
Discharge: HOME | End: 2022-11-09
Attending: INTERNAL MEDICINE

## 2022-11-09 DIAGNOSIS — K50.80 CROHN'S DISEASE OF BOTH SMALL AND LARGE INTESTINE WITHOUT COMPLICATION (HCC): ICD-10-CM

## 2022-11-09 DIAGNOSIS — D84.9 IMMUNOCOMPROMISED PATIENT (HCC): ICD-10-CM

## 2022-11-09 DIAGNOSIS — Z79.899 LONG TERM CURRENT USE OF IMMUNOSUPPRESSIVE DRUG: ICD-10-CM

## 2022-11-09 RX ADMIN — GADOBUTROL 5 ML: 604.72 INJECTION INTRAVENOUS at 10:42

## 2022-11-09 RX ADMIN — GLUCAGON HYDROCHLORIDE 1 MG: KIT at 10:04

## 2023-07-19 ENCOUNTER — TELEPHONE (OUTPATIENT)
Dept: GASTROENTEROLOGY | Facility: CLINIC | Age: 38
End: 2023-07-19

## 2023-07-19 NOTE — TELEPHONE ENCOUNTER
Patient has not been seen since 9/2022 with no follow up currently scheduled. She is on biologic therapy and is due for reauthorization 10/2023. Can we please get patient scheduled for follow up prior to October 2023. Thank you!

## 2023-07-20 NOTE — TELEPHONE ENCOUNTER
Patient returned a phone call to schedule an appointment. Appointment was scheduled for 9/18/23. Patient confirmed she is able to make that appointment.

## 2023-09-11 ENCOUNTER — OFFICE VISIT (OUTPATIENT)
Dept: GASTROENTEROLOGY | Facility: CLINIC | Age: 38
End: 2023-09-11
Payer: COMMERCIAL

## 2023-09-11 ENCOUNTER — TELEPHONE (OUTPATIENT)
Dept: GASTROENTEROLOGY | Facility: CLINIC | Age: 38
End: 2023-09-11

## 2023-09-11 VITALS
BODY MASS INDEX: 19.96 KG/M2 | TEMPERATURE: 98.8 F | OXYGEN SATURATION: 99 % | DIASTOLIC BLOOD PRESSURE: 60 MMHG | WEIGHT: 124.2 LBS | HEIGHT: 66 IN | HEART RATE: 77 BPM | SYSTOLIC BLOOD PRESSURE: 93 MMHG

## 2023-09-11 DIAGNOSIS — K62.5 RECTAL BLEEDING: ICD-10-CM

## 2023-09-11 DIAGNOSIS — K50.80 CROHN'S DISEASE OF BOTH SMALL AND LARGE INTESTINE WITHOUT COMPLICATION (HCC): Primary | ICD-10-CM

## 2023-09-11 DIAGNOSIS — R19.7 DIARRHEA, UNSPECIFIED TYPE: ICD-10-CM

## 2023-09-11 DIAGNOSIS — D84.9 IMMUNOCOMPROMISED PATIENT (HCC): ICD-10-CM

## 2023-09-11 PROCEDURE — 99214 OFFICE O/P EST MOD 30 MIN: CPT | Performed by: INTERNAL MEDICINE

## 2023-09-11 RX ORDER — DICYCLOMINE HYDROCHLORIDE 10 MG/1
CAPSULE ORAL
COMMUNITY

## 2023-09-11 RX ORDER — EPINEPHRINE 1 MG/ML
0.3 INJECTION, SOLUTION, CONCENTRATE INTRAVENOUS
COMMUNITY
Start: 2023-04-19

## 2023-09-11 RX ORDER — PREDNISONE 10 MG/1
TABLET ORAL
Qty: 70 TABLET | Refills: 0 | Status: SHIPPED | OUTPATIENT
Start: 2023-09-11 | End: 2023-10-09

## 2023-09-11 NOTE — PROGRESS NOTES
[facility] - Outpatient Follow-up Note  Donna Florez 41 y.o. female MRN: [id number]  Encounter: [id number]          ASSESSMENT AND PLAN:    Donna Florez is a 41 y.o. female with Crohn's disease diagnosed approximately at the age of 23 (previously on [address] [facility], [address]) most recently on Remicade since 2017 with most recent flare in February 2022 but improvement in symptoms with dietary changes and stress management, who now presents for follow-up. At her last visit it was noted her infliximab level was 2.7 with antibodies greater than 100 and her Remicade dose was increased. She has not been feeling well recently and her most recent remicade infusion did not appear to help. Colonoscopy from June 2022 with no polyps and also no significant activity with respect to Crohn's disease macroscopically. Biopsies relatively unremarkable. MRI reviewed from November 2022 with no findings of active or chronic inflammatory bowel disease. Prior CMP, CBC normal.  CRP normal.      1. Crohn's disease of both small and large intestine without complication ([address])    2. Immunocompromised patient ([address])    3. Rectal bleeding    4. Diarrhea, unspecified type        Orders Placed This Encounter   Procedures   • CBC and differential   • Comprehensive metabolic panel   • C-reactive protein   • Vitamin B12   • Vitamin D 25 hydroxy   • Infliximab Concentration and Anti-Infliximab Antibody   • Quantiferon TB Gold Plus   • Chronic Hepatitis Panel     Continue infliximab every 6 weeks.  Will try to get it approved for 10mg/kg  Repeat infliximab level prior to the next infusion  Consider Skyrizi  Next blood work due now  Next quant gold and hepatitis panel due now  Prednisone taper  Next MR enterography due TBD  Next colonoscopy TBD    Avoid live virus vaccines  Yearly flu shot  COVID vaccine and booster  Pneumonia vaccine  Shingrix  Routine Pap smears  Routine skin exams with the dermatologist    ______________________________________________________________________    SUBJECTIVE:    Donna Florez is a 41 y.o. female who presents with complaint of Crohn's. She has not been feeling well. Past 2-3 months, 1 week before infusion she can tell she is due for it. The last time, only 10 days ago, and it feels like it is not doing anything. Pain, cramping, 2 bloody stools. 1 week before the infusion it was the worst it ever was. It did not help. She has been tried and fatigued, and that got better, but everything got worse. On average 1 BMs per day or every other day, but in the past week 3-4 per day, + formed (only 1 time was it diarrhea). Hard to soft.   + blood, a little after the infusion. Towards the end of last week she had blood and streaks and red. No melena  occasional urgency but it is still formed  + cramping lower abdominal pain. Before she was diagnosed with would get pain immediately with eating. Now it is random (can occur even when not eating). Hydration helps. Lasts only a few minutes at a time. No rashes, no mouth sores, no recent joint pains  No heartburn. Occasional difficulty swallowing but not consistent. No nausea, vomiting  No weight loss. REVIEW OF SYSTEMS IS OTHERWISE NEGATIVE.   10 point ROS reviewed and negative, except as above      Historical Information   Past Medical History:   Diagnosis Date   • Crohn's disease [facility])      Past Surgical History:   Procedure Laterality Date   • COLONOSCOPY  12/12/2016   • COLONOSCOPY     • EGD  06/06/2011   • INGUINAL HERNIA REPAIR  1988     Social History   Social History     Substance and Sexual Activity   Alcohol Use Not Currently     Social History     Substance and Sexual Activity   Drug Use No     Social History     Tobacco Use   Smoking Status Never   Smokeless Tobacco Never     Family History   Problem Relation Age of Onset   • No Known Problems Mother    • Hypertension Father    • No Known Problems Brother    • GI problems Maternal Grandmother    • Kidney cancer Maternal Grandfather    • No Known Problems Paternal Grandmother    • No Known Problems Paternal Grandfather    • Colon polyps Neg Hx    • Colon cancer Neg Hx        Meds/Allergies       Current Outpatient Medications:   •  inFLIXimab (REMICADE) 100 mg  •  MULTIPLE VITAMIN PO  •  predniSONE 10 mg tablet  •  dicyclomine (BENTYL) 10 mg capsule  •  EPINEPHrine PF (ADRENALIN) 1 mg/mL  •  hydrocortisone (Solu-CORTEF) 100 mg SOLR  •  methylPREDNISolone sodium succinate (Solu-MEDROL) 2,000 mg injection  •  Prenatal Vit-Fe Fumarate-FA (PRENATAL 1+1 PO)    No Known Allergies        Objective     Blood pressure 93/60, pulse 77, temperature 98.8 °F (37.1 °C), temperature source Tympanic, height 5' 6" (1.676 m), weight 56.3 kg (124 lb 3.2 oz), SpO2 99 %, unknown if currently breastfeeding. Body mass index is 20.05 kg/m². PHYSICAL EXAMINATION:    General Appearance:   Alert, cooperative, no distress   HEENT:  Normocephalic, atraumatic, anicteric. Neck supple, symmetrical, trachea midline. Lungs:   Equal chest rise and unlabored breathing, normal effort, no coughing. Cardiovascular:   No visualized JVD. Abdomen:   No abdominal distension. Skin:   No jaundice, rashes, or lesions. Musculoskeletal:   Normal range of motion visualized. Psych:  Normal affect and normal insight. Neuro:  Alert and appropriate. Lab Results:   No visits with results within 1 Day(s) from this visit.    Latest known visit with results is:   Telephone on 07/01/2022   Component Date Value   • [facility] Quantiferon(R)-TB * 07/11/2022 NEGATIVE    • NIL 07/11/2022 0.02    • Mitogen-NIL 07/11/2022 >10.00    • TB1-NIL 07/11/2022 0.00    • TB2-NIL 07/11/2022 0.00        Lab Results   Component Value Date    WBC 8.4 05/11/2022    HGB 13.0 05/11/2022    HCT 38.9 05/11/2022    MCV 91.1 05/11/2022     05/11/2022       Lab Results   Component Value Date    SODIUM 139 05/11/2022    K 4.0 05/11/2022     05/11/2022    CO2 25 05/11/2022    BUN 11 05/11/2022    CREATININE 0.77 05/11/2022    GLUC 127 05/11/2022    CALCIUM 9.2 05/11/2022    AST 13 05/11/2022    ALT 8 05/11/2022    ALKPHOS 56 05/11/2022    TP 6.7 05/11/2022    TBILI 0.6 05/11/2022       Lab Results   Component Value Date    CRP 1.6 05/11/2022       No results found for: "[id number]", "TSH"    No results found for: "IRON", "TIBC", "FERRITIN"    Radiology Results:   No results found.

## 2023-09-13 NOTE — TELEPHONE ENCOUNTER
IVX form received from provider and faxed to [facility] with last clinical note

## 2023-10-14 LAB
25(OH)D3 SERPL-MCNC: 31 NG/ML (ref 30–100)
ALBUMIN SERPL-MCNC: 4.1 G/DL (ref 3.6–5.1)
ALBUMIN/GLOB SERPL: 1.6 (CALC) (ref 1–2.5)
ALP SERPL-CCNC: 57 U/L (ref 31–125)
ALT SERPL-CCNC: 10 U/L (ref 6–29)
AST SERPL-CCNC: 18 U/L (ref 10–30)
BASOPHILS # BLD AUTO: 32 CELLS/UL (ref 0–200)
BASOPHILS NFR BLD AUTO: 0.4 %
BILIRUB SERPL-MCNC: 0.7 MG/DL (ref 0.2–1.2)
BUN SERPL-MCNC: 11 MG/DL (ref 7–25)
BUN/CREAT SERPL: NORMAL (CALC) (ref 6–22)
CALCIUM SERPL-MCNC: 9.4 MG/DL (ref 8.6–10.2)
CHLORIDE SERPL-SCNC: 107 MMOL/L (ref 98–110)
CO2 SERPL-SCNC: 26 MMOL/L (ref 20–32)
CREAT SERPL-MCNC: 0.75 MG/DL (ref 0.5–0.97)
CRP SERPL-MCNC: 2 MG/L
EOSINOPHIL # BLD AUTO: 103 CELLS/UL (ref 15–500)
EOSINOPHIL NFR BLD AUTO: 1.3 %
ERYTHROCYTE [DISTWIDTH] IN BLOOD BY AUTOMATED COUNT: 12 % (ref 11–15)
GFR/BSA.PRED SERPLBLD CYS-BASED-ARV: 105 ML/MIN/1.73M2
GLOBULIN SER CALC-MCNC: 2.5 G/DL (CALC) (ref 1.9–3.7)
GLUCOSE SERPL-MCNC: 86 MG/DL (ref 65–99)
HCT VFR BLD AUTO: 43.9 % (ref 35–45)
HGB BLD-MCNC: 15.2 G/DL (ref 11.7–15.5)
LYMPHOCYTES # BLD AUTO: 2441 CELLS/UL (ref 850–3900)
LYMPHOCYTES NFR BLD AUTO: 30.9 %
MCH RBC QN AUTO: 32.2 PG (ref 27–33)
MCHC RBC AUTO-ENTMCNC: 34.6 G/DL (ref 32–36)
MCV RBC AUTO: 93 FL (ref 80–100)
MONOCYTES # BLD AUTO: 474 CELLS/UL (ref 200–950)
MONOCYTES NFR BLD AUTO: 6 %
NEUTROPHILS # BLD AUTO: 4851 CELLS/UL (ref 1500–7800)
NEUTROPHILS NFR BLD AUTO: 61.4 %
PLATELET # BLD AUTO: 183 THOUSAND/UL (ref 140–400)
PMV BLD REES-ECKER: 13.3 FL (ref 7.5–12.5)
POTASSIUM SERPL-SCNC: 4.2 MMOL/L (ref 3.5–5.3)
PROT SERPL-MCNC: 6.6 G/DL (ref 6.1–8.1)
RBC # BLD AUTO: 4.72 MILLION/UL (ref 3.8–5.1)
SODIUM SERPL-SCNC: 141 MMOL/L (ref 135–146)
VIT B12 SERPL-MCNC: 327 PG/ML (ref 200–1100)
WBC # BLD AUTO: 7.9 THOUSAND/UL (ref 3.8–10.8)

## 2023-12-31 NOTE — TELEPHONE ENCOUNTER
----- Message from [address] sent at 9/15/2022  5:26 PM EDT -----   Randy Cervantes -  I am referring this patient to you  She is a 60-year-old who was diagnosed with Crohn's in her 19's  She has been managed on infliximab since 2018  However she had recent flares in January and May  Colonoscopy was performed in June and showed no mucosal activity and biopsies were negative  Infliximab levels drawn in July just prior to infusion where okay but she was found to have antibodies  She is clinically in remission but  I'm not sure where to go from here  She was more than happy to  see you and may want to continue to follow with you to keep her disease under control   Thank you so much and please  reach out if you have any questions
Hi,    Can you schedule the patient for a visit with me? (for whenever it is convenient for her)    Thank you!
Left back line phone number for patient to return my call to schedule office visit with Dr Juliana Roberto  Back line phone number given 
Pt returned call and scheduled office visit 
(1) Oriented to own ability

## 2024-01-17 ENCOUNTER — OFFICE VISIT (OUTPATIENT)
Dept: GASTROENTEROLOGY | Facility: CLINIC | Age: 39
End: 2024-01-17
Payer: COMMERCIAL

## 2024-01-17 VITALS
SYSTOLIC BLOOD PRESSURE: 96 MMHG | OXYGEN SATURATION: 99 % | TEMPERATURE: 97.5 F | BODY MASS INDEX: 20.57 KG/M2 | DIASTOLIC BLOOD PRESSURE: 59 MMHG | HEART RATE: 105 BPM | HEIGHT: 66 IN | WEIGHT: 128 LBS

## 2024-01-17 DIAGNOSIS — K50.80 CROHN'S DISEASE OF BOTH SMALL AND LARGE INTESTINE WITHOUT COMPLICATION (HCC): Primary | ICD-10-CM

## 2024-01-17 DIAGNOSIS — D84.9 IMMUNOCOMPROMISED PATIENT (HCC): ICD-10-CM

## 2024-01-17 DIAGNOSIS — R10.9 ABDOMINAL PAIN, UNSPECIFIED ABDOMINAL LOCATION: ICD-10-CM

## 2024-01-17 DIAGNOSIS — R19.7 DIARRHEA, UNSPECIFIED TYPE: ICD-10-CM

## 2024-01-17 DIAGNOSIS — Z11.1 SCREENING FOR TUBERCULOSIS: ICD-10-CM

## 2024-01-17 DIAGNOSIS — F43.21 GRIEF AT LOSS OF CHILD: ICD-10-CM

## 2024-01-17 DIAGNOSIS — Z63.4 GRIEF AT LOSS OF CHILD: ICD-10-CM

## 2024-01-17 PROCEDURE — 99214 OFFICE O/P EST MOD 30 MIN: CPT | Performed by: INTERNAL MEDICINE

## 2024-01-17 SDOH — SOCIAL STABILITY - SOCIAL INSECURITY: DISSAPEARANCE AND DEATH OF FAMILY MEMBER: Z63.4

## 2024-01-17 NOTE — PROGRESS NOTES
[facility] - Outpatient Follow-up Note  Belkis Ledezma 38 y.o. female MRN: [id number]  Encounter: [id number]          ASSESSMENT AND PLAN:    Belkis Ledezma is a 38 y.o. female with Crohn's disease diagnosed at the age of 26 (previously on Lialda, Imuran, Cimzia) most recently on Remicade since 2017 who overall has been doing well but had a flare in February 2022 which subsequently improved with dietary changes and stress management, now presenting for follow-up.  When she was last seen in September 2023 she had noted that she was not feeling as well at that time and she felt as if her Remicade infusion was not helping as much as it should.  At that time we try to increase her infusions to 10 mg/kg dosing. She has been grieving over the death of her daughter. Her symptoms are worse with stress.     Colonoscopy from June 2022 at that time with grade 1 hemorrhoids but overall the colon was normal in appearance.  Biopsies were histologically unremarkable.    MR enterography from November 2022 with no findings of active or chronic inflammatory bowel disease.    Blood work from October notable for CMP with normal electrolytes, creatinine, liver tests.  Vitamin B12 normal 327 at that time.  Blood cell count, hemoglobin, MCV, platelets were normal.  CRP normal.  Vitamin D normal.    1. Crohn's disease of both small and large intestine without complication (HCC)    2. Immunocompromised patient (HCC)    3. Diarrhea, unspecified type    4. Screening for tuberculosis    5. Abdominal pain, unspecified abdominal location    6. Grief at loss of child        Orders Placed This Encounter   Procedures    Calprotectin,Fecal    CBC and differential    Comprehensive metabolic panel    C-reactive protein    Infliximab Concentration and Anti-Infliximab Antibody    Chronic Hepatitis Panel    Ambulatory referral to Psych Services     Can continue infliximab every 6 weeks at 10 mg/kg dosing  Repeat infliximab  level  Can consider switching to Skyrizi or Rinvoq in the future if needed  Next blood work ordered today  Next quant gold and hepatitis panel ordered today    Next MR enterography to be determined  Next colonoscopy 2025    General IBD Healthcare maintenance:  Avoid live virus vaccines  Yearly flu shot  COVID vaccine and booster  Pneumonia vaccine  Shingrix  Routine skin exams with the dermatologist  Routine Pap smears  Routine mammograms  ______________________________________________________________________    SUBJECTIVE:    Belkis Ledezma is a 38 y.o. female who presents with complaint of Crohn's.    Her daughter passed away  Stressed a lot. Symptoms including pain with eating, diarrhea. She will get over it and it will pass after a few days. She is not seeing a therapist. She is back to work.       Answers submitted by the patient for this visit:  Inflammatory Bowel Disease (Submitted on 1/10/2024)  Did the infection require hospitalization?: No  Did the infection require antibiotics?: No  Is there any possibility that you may be pregnant?: No  In the past three months, have you used tobacco in any form?: No  During the last year, how many days have you missed work or school because of your inflammatory bowel disease?: 0  During the last year, how many days have you been hospitalized because of your inflammatory bowel disease?: 0  During the last year, how many days have you visited a hospital emergency department because of your inflammatory bowel disease?: 0  During the last month, have you taken narcotic pain medications (such as Percocet, oxycodone, Oxycontin, morphine, Vicodin, Dilaudid, MS Contin) for your inflammatory bowel disease?: No  Have you awoken at night because you needed to move your bowels during the last month? : No  Have you had leakage of stool while sleeping during the last month?: No  Have you had leakage of stool while you were awake during the last month?: No  In the last 6 months, have  you unintentionally lost weight?: No  Fever: No  Eye irritation: No  Mouth sores: No  Sore throat: Yes  Chest pain: No  Shortness of breath: Yes  Numbness or tingling in your hands or feet: No  Skin rash: No  Pain or swelling in your joints: Yes  Bruising or bleeding: No  Felt depressed or blue: Yes  Inflammatory Bowel Disease (Submitted on 1/10/2024)  When you are not experiencing symptoms of your inflammatory bowel disease, how many bowel movements do you typically have each day?: 0-1  What is the average (typical) number of bowel movements that you had in a single day during the last week?: 3  Over the last 3 days, have you had any bowel movements where you passed blood without stool?: No  Since your last visit, have you received any vaccinations?: No  Since the last visit, have you had an infection?: Yes      REVIEW OF SYSTEMS IS OTHERWISE NEGATIVE.  10 point ROS reviewed and negative, except as above      Historical Information   Past Medical History:   Diagnosis Date    Crohn's disease (HCC)      Past Surgical History:   Procedure Laterality Date    COLONOSCOPY  12/12/2016    COLONOSCOPY      EGD  06/06/2011    INGUINAL HERNIA REPAIR  1988     Social History   Social History     Substance and Sexual Activity   Alcohol Use Yes    Alcohol/week: 2.0 standard drinks of alcohol    Types: 1 Glasses of wine, 1 Cans of beer per week     Social History     Substance and Sexual Activity   Drug Use No     Social History     Tobacco Use   Smoking Status Never   Smokeless Tobacco Never     Family History   Problem Relation Age of Onset    No Known Problems Mother     Hypertension Father     No Known Problems Brother     GI problems Maternal Grandmother     Kidney cancer Maternal Grandfather     No Known Problems Paternal Grandmother     No Known Problems Paternal Grandfather     Colon polyps Neg Hx     Colon cancer Neg Hx        Meds/Allergies       Current Outpatient Medications:     inFLIXimab (REMICADE) 100 mg     "MULTIPLE VITAMIN PO    No Known Allergies        Objective     Blood pressure 96/59, pulse 105, temperature 97.5 °F (36.4 °C), temperature source Tympanic, height 5' 6\" (1.676 m), weight 58.1 kg (128 lb), SpO2 99%, unknown if currently breastfeeding. Body mass index is 20.66 kg/m².    PHYSICAL EXAMINATION:    General Appearance:   Alert, cooperative, no distress   HEENT:  Normocephalic, atraumatic, anicteric. Neck supple, symmetrical, trachea midline.   Lungs:   Equal chest rise and unlabored breathing, normal effort, no coughing.   Cardiovascular:   No visualized JVD.   Abdomen:   No abdominal distension.   Skin:   No jaundice, rashes, or lesions.    Musculoskeletal:   Normal range of motion visualized.   Psych:  Normal affect and normal insight.   Neuro:  Alert and appropriate.         Lab Results:   No visits with results within 1 Day(s) from this visit.   Latest known visit with results is:   Office Visit on 09/11/2023   Component Date Value    White Blood Cell Count 10/13/2023 7.9     Red Blood Cell Count 10/13/2023 4.72     Hemoglobin 10/13/2023 15.2     HCT 10/13/2023 43.9     MCV 10/13/2023 93.0     MCH 10/13/2023 32.2     MCHC 10/13/2023 34.6     RDW 10/13/2023 12.0     Platelet Count 10/13/2023 183     SL AMB MPV 10/13/2023 13.3 (H)     Neutrophils (Absolute) 10/13/2023 4,851     Lymphocytes (Absolute) 10/13/2023 2,441     Monocytes (Absolute) 10/13/2023 474     Eosinophils (Absolute) 10/13/2023 103     Basophils ABS 10/13/2023 32     Neutrophils 10/13/2023 61.4     Lymphocytes 10/13/2023 30.9     Monocytes 10/13/2023 6.0     Eosinophils 10/13/2023 1.3     Basophils PCT 10/13/2023 0.4     Glucose, Random 10/13/2023 86     BUN 10/13/2023 11     Creatinine 10/13/2023 0.75     eGFR 10/13/2023 105     SL AMB BUN/CREATININE RA* 10/13/2023 SEE NOTE:     Sodium 10/13/2023 141     Potassium 10/13/2023 4.2     Chloride 10/13/2023 107     CO2 10/13/2023 26     Calcium 10/13/2023 9.4     Protein, Total 10/13/2023 6.6 " "    Albumin 10/13/2023 4.1     Globulin 10/13/2023 2.5     Albumin/Globulin Ratio 10/13/2023 1.6     TOTAL BILIRUBIN 10/13/2023 0.7     Alkaline Phosphatase 10/13/2023 57     AST 10/13/2023 18     ALT 10/13/2023 10     C-Reactive Protein, Quant 10/13/2023 2.0     Vitamin B-12 10/13/2023 327     Vitamin D, 25-Hydroxy, S* 10/13/2023 31        Lab Results   Component Value Date    WBC 7.9 10/13/2023    HGB 15.2 10/13/2023    HCT 43.9 10/13/2023    MCV 93.0 10/13/2023     10/13/2023       Lab Results   Component Value Date    SODIUM 141 10/13/2023    K 4.2 10/13/2023     10/13/2023    CO2 26 10/13/2023    BUN 11 10/13/2023    CREATININE 0.75 10/13/2023    GLUC 86 10/13/2023    CALCIUM 9.4 10/13/2023    AST 18 10/13/2023    ALT 10 10/13/2023    ALKPHOS 57 10/13/2023    TP 6.6 10/13/2023    TBILI 0.7 10/13/2023    EGFR 105 10/13/2023       Lab Results   Component Value Date    CRP 2.0 10/13/2023       No results found for: \"[id number]\", \"TSH\"    No results found for: \"IRON\", \"TIBC\", \"FERRITIN\"    Radiology Results:   No results found.  "

## 2024-01-20 LAB
ALBUMIN SERPL-MCNC: 4.1 G/DL (ref 3.6–5.1)
ALBUMIN/GLOB SERPL: 1.6 (CALC) (ref 1–2.5)
ALP SERPL-CCNC: 54 U/L (ref 31–125)
ALT SERPL-CCNC: 7 U/L (ref 6–29)
AST SERPL-CCNC: 11 U/L (ref 10–30)
BASOPHILS # BLD AUTO: 28 CELLS/UL (ref 0–200)
BASOPHILS NFR BLD AUTO: 0.4 %
BILIRUB SERPL-MCNC: 0.6 MG/DL (ref 0.2–1.2)
BUN SERPL-MCNC: 14 MG/DL (ref 7–25)
BUN/CREAT SERPL: NORMAL (CALC) (ref 6–22)
CALCIUM SERPL-MCNC: 9.4 MG/DL (ref 8.6–10.2)
CHLORIDE SERPL-SCNC: 105 MMOL/L (ref 98–110)
CO2 SERPL-SCNC: 27 MMOL/L (ref 20–32)
CREAT SERPL-MCNC: 0.8 MG/DL (ref 0.5–0.97)
CRP SERPL-MCNC: 0.5 MG/L
EOSINOPHIL # BLD AUTO: 91 CELLS/UL (ref 15–500)
EOSINOPHIL NFR BLD AUTO: 1.3 %
ERYTHROCYTE [DISTWIDTH] IN BLOOD BY AUTOMATED COUNT: 11.7 % (ref 11–15)
GFR/BSA.PRED SERPLBLD CYS-BASED-ARV: 97 ML/MIN/1.73M2
GLOBULIN SER CALC-MCNC: 2.6 G/DL (CALC) (ref 1.9–3.7)
GLUCOSE SERPL-MCNC: 87 MG/DL (ref 65–99)
HCT VFR BLD AUTO: 40.3 % (ref 35–45)
HGB BLD-MCNC: 13.8 G/DL (ref 11.7–15.5)
LYMPHOCYTES # BLD AUTO: 3024 CELLS/UL (ref 850–3900)
LYMPHOCYTES NFR BLD AUTO: 43.2 %
MCH RBC QN AUTO: 31.3 PG (ref 27–33)
MCHC RBC AUTO-ENTMCNC: 34.2 G/DL (ref 32–36)
MCV RBC AUTO: 91.4 FL (ref 80–100)
MONOCYTES # BLD AUTO: 637 CELLS/UL (ref 200–950)
MONOCYTES NFR BLD AUTO: 9.1 %
NEUTROPHILS # BLD AUTO: 3220 CELLS/UL (ref 1500–7800)
NEUTROPHILS NFR BLD AUTO: 46 %
PLATELET # BLD AUTO: 205 THOUSAND/UL (ref 140–400)
PMV BLD REES-ECKER: 12.6 FL (ref 7.5–12.5)
POTASSIUM SERPL-SCNC: 4.2 MMOL/L (ref 3.5–5.3)
PROT SERPL-MCNC: 6.7 G/DL (ref 6.1–8.1)
RBC # BLD AUTO: 4.41 MILLION/UL (ref 3.8–5.1)
SODIUM SERPL-SCNC: 139 MMOL/L (ref 135–146)
WBC # BLD AUTO: 7 THOUSAND/UL (ref 3.8–10.8)

## 2024-01-22 LAB
ALBUMIN SERPL-MCNC: 4.1 G/DL (ref 3.6–5.1)
ALBUMIN/GLOB SERPL: 1.6 (CALC) (ref 1–2.5)
ALP SERPL-CCNC: 54 U/L (ref 31–125)
ALT SERPL-CCNC: 7 U/L (ref 6–29)
AST SERPL-CCNC: 11 U/L (ref 10–30)
BASOPHILS # BLD AUTO: 28 CELLS/UL (ref 0–200)
BASOPHILS NFR BLD AUTO: 0.4 %
BILIRUB SERPL-MCNC: 0.6 MG/DL (ref 0.2–1.2)
BUN SERPL-MCNC: 14 MG/DL (ref 7–25)
BUN/CREAT SERPL: NORMAL (CALC) (ref 6–22)
CALCIUM SERPL-MCNC: 9.4 MG/DL (ref 8.6–10.2)
CALPROTECTIN STL-MCNT: NORMAL UG/G
CHLORIDE SERPL-SCNC: 105 MMOL/L (ref 98–110)
CO2 SERPL-SCNC: 27 MMOL/L (ref 20–32)
CREAT SERPL-MCNC: 0.8 MG/DL (ref 0.5–0.97)
CRP SERPL-MCNC: 0.5 MG/L
EOSINOPHIL # BLD AUTO: 91 CELLS/UL (ref 15–500)
EOSINOPHIL NFR BLD AUTO: 1.3 %
ERYTHROCYTE [DISTWIDTH] IN BLOOD BY AUTOMATED COUNT: 11.7 % (ref 11–15)
GFR/BSA.PRED SERPLBLD CYS-BASED-ARV: 97 ML/MIN/1.73M2
GLOBULIN SER CALC-MCNC: 2.6 G/DL (CALC) (ref 1.9–3.7)
GLUCOSE SERPL-MCNC: 87 MG/DL (ref 65–99)
HCT VFR BLD AUTO: 40.3 % (ref 35–45)
HGB BLD-MCNC: 13.8 G/DL (ref 11.7–15.5)
LYMPHOCYTES # BLD AUTO: 3024 CELLS/UL (ref 850–3900)
LYMPHOCYTES NFR BLD AUTO: 43.2 %
MCH RBC QN AUTO: 31.3 PG (ref 27–33)
MCHC RBC AUTO-ENTMCNC: 34.2 G/DL (ref 32–36)
MCV RBC AUTO: 91.4 FL (ref 80–100)
MONOCYTES # BLD AUTO: 637 CELLS/UL (ref 200–950)
MONOCYTES NFR BLD AUTO: 9.1 %
NEUTROPHILS # BLD AUTO: 3220 CELLS/UL (ref 1500–7800)
NEUTROPHILS NFR BLD AUTO: 46 %
PLATELET # BLD AUTO: 205 THOUSAND/UL (ref 140–400)
PMV BLD REES-ECKER: 12.6 FL (ref 7.5–12.5)
POTASSIUM SERPL-SCNC: 4.2 MMOL/L (ref 3.5–5.3)
PROT SERPL-MCNC: 6.7 G/DL (ref 6.1–8.1)
RBC # BLD AUTO: 4.41 MILLION/UL (ref 3.8–5.1)
SODIUM SERPL-SCNC: 139 MMOL/L (ref 135–146)
WBC # BLD AUTO: 7 THOUSAND/UL (ref 3.8–10.8)

## 2024-02-27 ENCOUNTER — TELEPHONE (OUTPATIENT)
Dept: PSYCHIATRY | Facility: CLINIC | Age: 39
End: 2024-02-27

## 2024-07-23 ENCOUNTER — OFFICE VISIT (OUTPATIENT)
Dept: GASTROENTEROLOGY | Facility: CLINIC | Age: 39
End: 2024-07-23
Payer: COMMERCIAL

## 2024-07-23 VITALS
OXYGEN SATURATION: 98 % | TEMPERATURE: 98.6 F | BODY MASS INDEX: 20.89 KG/M2 | DIASTOLIC BLOOD PRESSURE: 61 MMHG | SYSTOLIC BLOOD PRESSURE: 94 MMHG | WEIGHT: 130 LBS | HEIGHT: 66 IN | HEART RATE: 102 BPM

## 2024-07-23 DIAGNOSIS — R10.9 ABDOMINAL PAIN, UNSPECIFIED ABDOMINAL LOCATION: ICD-10-CM

## 2024-07-23 DIAGNOSIS — R19.7 DIARRHEA, UNSPECIFIED TYPE: ICD-10-CM

## 2024-07-23 DIAGNOSIS — D84.9 IMMUNOCOMPROMISED PATIENT (HCC): ICD-10-CM

## 2024-07-23 DIAGNOSIS — K50.80 CROHN'S DISEASE OF BOTH SMALL AND LARGE INTESTINE WITHOUT COMPLICATION (HCC): Primary | ICD-10-CM

## 2024-07-23 PROCEDURE — 99214 OFFICE O/P EST MOD 30 MIN: CPT | Performed by: INTERNAL MEDICINE

## 2024-07-23 RX ORDER — DICYCLOMINE HYDROCHLORIDE 10 MG/1
10 CAPSULE ORAL
Qty: 120 CAPSULE | Refills: 3 | Status: SHIPPED | OUTPATIENT
Start: 2024-07-23

## 2024-07-23 NOTE — PROGRESS NOTES
[facility] - Outpatient Follow-up Note  Belkis Ledezma 39 y.o. female MRN: [id number]  Encounter: [id number]          ASSESSMENT AND PLAN:    Belkis Ledezma is a 39 y.o. female with small and large bowel Crohn's disease diagnosed at the age of 26 (previously on Lialda, Imuran, Cimzia) most recently on Remicade since 2017 who is overall doing well until February 2022 when she had a Crohn's flare which improved with dietary changes and stress management, now presenting for follow-up. She has been having symptoms of pain and diarrhea, possible crohn's flare vs IBS vs combination of the two.     Colonoscopy from June 2022 with grade 1 hemorrhoids but overall the colon was normal in appearance and biopsies were overall histologically unremarkable.  MR enterography November 2022 with no findings of active or chronic inflammatory bowel disease at that time.    MRI performed July 2024 with no acute intracranial findings but there was disc degenerative changes worse at the C4-C5 level.    From January notable for CMP with normal electrolytes, creatinine, liver test.  CBC with normal white blood cell count, hemoglobin, MCV, platelets.  CRP normal.  Blood work from 2023 with relatively normal vitamin D and B12 at that time.    1. Crohn's disease of both small and large intestine without complication (HCC)    2. Immunocompromised patient (HCC)    3. Diarrhea, unspecified type    4. Abdominal pain, unspecified abdominal location        Orders Placed This Encounter   Procedures    Calprotectin,Fecal    Stool Enteric Bacterial Panel by PCR    Giardia, EIA, Ova/Parasite    Ova and parasite examination    Pancreatic elastase, fecal    Fecal fat, qualitative    Clostridium difficile toxin by PCR with EIA    CBC and differential    Comprehensive metabolic panel    C-reactive protein    Vitamin D 25 hydroxy    Vitamin B12    Hepatitis B surface antigen    Hepatitis B core antibody, total    Infliximab  Concentration and Anti-Infliximab Antibody     Continue infliximab 10 mg/kg dosing every 6 weeks  Recheck infliximab level  Repeat blood work ordered today  Fecal calprotectin  Quant gold and hepatitis panel ordered today  Future medication options include Skyrizi versus Rinvoq versus Stelara  Bentyl to help manage symptoms    Next colonoscopy in 2025  Next MR enterography potentially 2025    Avoid live virus vaccines  Yearly flu shot  COVID vaccine and booster  Pneumonia vaccine  Shingrix  Routine skin exams with the dermatologist  Routine Pap smears  ______________________________________________________________________    SUBJECTIVE:    Belkis Ledezma is a 39 y.o. female who presents with complaint of Crohn's.     She is having a busy summer.   She is not feeling great in terms of the Crohn's. When she was in [place], eating and drink (coffee or seltzer) she got diarrhea and it upset her stomach. She was due for Remicade but she was away. She got her infusion late. She then felt better but these past 2 weeks she feels out of control. Not stabbing and cramping pain but stomach upset. Was walking today, stomach hurt. Drinking coffee hurt her stomach. She took a shower and was rushing and it upset her stomach.   She has diarrhea every 2 days. It starts normal but then more frequency and less consistency.     Answers submitted by the patient for this visit:  Inflammatory Bowel Disease (Submitted on 7/16/2024)  Did the infection require hospitalization?: No  Did the infection require antibiotics?: No  Is there any possibility that you may be pregnant?: No  In the past three months, have you used tobacco in any form?: No  During the last year, how many days have you missed work or school because of your inflammatory bowel disease?: 0  During the last year, how many days have you been hospitalized because of your inflammatory bowel disease?: 0  During the last year, how many days have you visited a hospital emergency  department because of your inflammatory bowel disease?: 0  During the last month, have you taken narcotic pain medications (such as Percocet, oxycodone, Oxycontin, morphine, Vicodin, Dilaudid, MS Contin) for your inflammatory bowel disease?: No  Have you awoken at night because you needed to move your bowels during the last month? : No  Have you had leakage of stool while sleeping during the last month?: No  Have you had leakage of stool while you were awake during the last month?: No  In the last 6 months, have you unintentionally lost weight?: No  Fever: No  Eye irritation: No  Mouth sores: No  Sore throat: Yes  Chest pain: No  Shortness of breath: No  Numbness or tingling in your hands or feet: Yes  Skin rash: No  Pain or swelling in your joints: No  Bruising or bleeding: No  Felt depressed or blue: No  Inflammatory Bowel Disease (Submitted on 7/16/2024)  When you are not experiencing symptoms of your inflammatory bowel disease, how many bowel movements do you typically have each day?: 0-1  What is the average (typical) number of bowel movements that you had in a single day during the last week?: 4  Over the last 3 days, have you had any bowel movements where you passed blood without stool?: No  Since your last visit, have you received any vaccinations?: No  Since the last visit, have you had an infection?: Yes      REVIEW OF SYSTEMS IS OTHERWISE NEGATIVE.  10 point ROS reviewed and negative, except as above      Historical Information   Past Medical History:   Diagnosis Date    Crohn's disease (HCC)      Past Surgical History:   Procedure Laterality Date    COLONOSCOPY  12/12/2016    COLONOSCOPY      EGD  06/06/2011    INGUINAL HERNIA REPAIR  1988     Social History   Social History     Substance and Sexual Activity   Alcohol Use Yes    Alcohol/week: 2.0 standard drinks of alcohol    Types: 1 Glasses of wine, 1 Cans of beer per week     Social History     Substance and Sexual Activity   Drug Use No     Social  "History     Tobacco Use   Smoking Status Never   Smokeless Tobacco Never     Family History   Problem Relation Age of Onset    No Known Problems Mother     Hypertension Father     No Known Problems Brother     GI problems Maternal Grandmother     Kidney cancer Maternal Grandfather     No Known Problems Paternal Grandmother     No Known Problems Paternal Grandfather     Colon polyps Neg Hx     Colon cancer Neg Hx        Meds/Allergies       Current Outpatient Medications:     dicyclomine (BENTYL) 10 mg capsule    inFLIXimab (REMICADE) 100 mg    MULTIPLE VITAMIN PO    No Known Allergies        Objective     Blood pressure 94/61, pulse 102, temperature 98.6 °F (37 °C), temperature source Tympanic, height 5' 6\" (1.676 m), weight 59 kg (130 lb), SpO2 98%, unknown if currently breastfeeding. Body mass index is 20.98 kg/m².      PHYSICAL EXAMINATION:    General Appearance:   Alert, cooperative, no distress   HEENT:  Normocephalic, atraumatic, anicteric. Neck supple, symmetrical, trachea midline.   Lungs:   Equal chest rise and unlabored breathing, normal effort, no coughing.   Cardiovascular:   No visualized JVD.   Abdomen:   No abdominal distension.   Skin:   No jaundice, rashes, or lesions.    Musculoskeletal:   Normal range of motion visualized.   Psych:  Normal affect and normal insight.   Neuro:  Alert and appropriate.         Lab Results:   No visits with results within 1 Day(s) from this visit.   Latest known visit with results is:   Office Visit on 01/17/2024   Component Date Value    White Blood Cell Count 01/22/2024 7.0     Red Blood Cell Count 01/22/2024 4.41     Hemoglobin 01/22/2024 13.8     HCT 01/22/2024 40.3     MCV 01/22/2024 91.4     MCH 01/22/2024 31.3     MCHC 01/22/2024 34.2     RDW 01/22/2024 11.7     Platelet Count 01/22/2024 205     SL AMB MPV 01/22/2024 12.6 (H)     Neutrophils (Absolute) 01/22/2024 3,220     Lymphocytes (Absolute) 01/22/2024 3,024     Monocytes (Absolute) 01/22/2024 637     " "Eosinophils (Absolute) 01/22/2024 91     Basophils ABS 01/22/2024 28     Neutrophils 01/22/2024 46     Lymphocytes 01/22/2024 43.2     Monocytes 01/22/2024 9.1     Eosinophils 01/22/2024 1.3     Basophils PCT 01/22/2024 0.4     Glucose, Random 01/22/2024 87     BUN 01/22/2024 14     Creatinine 01/22/2024 0.80     eGFR 01/22/2024 97     SL AMB BUN/CREATININE RA* 01/22/2024 SEE NOTE:     Sodium 01/22/2024 139     Potassium 01/22/2024 4.2     Chloride 01/22/2024 105     CO2 01/22/2024 27     Calcium 01/22/2024 9.4     Protein, Total 01/22/2024 6.7     Albumin 01/22/2024 4.1     Globulin 01/22/2024 2.6     Albumin/Globulin Ratio 01/22/2024 1.6     TOTAL BILIRUBIN 01/22/2024 0.6     Alkaline Phosphatase 01/22/2024 54     AST 01/22/2024 11     ALT 01/22/2024 7     C-Reactive Protein, Quant 01/22/2024 0.5     Calprotectin 01/22/2024 TNP        Lab Results   Component Value Date    WBC 7.0 01/22/2024    HGB 13.8 01/22/2024    HCT 40.3 01/22/2024    MCV 91.4 01/22/2024     01/22/2024       Lab Results   Component Value Date    SODIUM 139 01/22/2024    K 4.2 01/22/2024     01/22/2024    CO2 27 01/22/2024    BUN 14 01/22/2024    CREATININE 0.80 01/22/2024    GLUC 87 01/22/2024    CALCIUM 9.4 01/22/2024    AST 11 01/22/2024    ALT 7 01/22/2024    ALKPHOS 54 01/22/2024    TP 6.7 01/22/2024    TBILI 0.6 01/22/2024    EGFR 97 01/22/2024       Lab Results   Component Value Date    CRP 0.5 01/22/2024       No results found for: \"[id number]\", \"TSH\"    No results found for: \"IRON\", \"TIBC\", \"FERRITIN\"    Radiology Results:   MRI Research 90 Minutes Neuro    Result Date: 7/19/2024  Narrative: BRAIN AND CERVICAL SPINE MRI, WITHOUT CONTRAST: CLINICAL INDICATION: Research study. TRACK-FA study. TECHNIQUE: Sagittal 3D T2 through the cervical spine with axial and coronal reconstructions, and Sagittal 3D T1 gradient echo images with axial and coronal reconstructions of the brain were acquired on a 3.0 Tram system. Additional " sequences were obtained as part of a research protocol. COMPARISON: Research MRI May5, 2023 FINDINGS: There are no signs of hydrocephalus. No large mass lesion or midline shift is seen. Basal cisterns are patent. Mild disc desiccation of the cervical spine intervertebral discs is again present, worse at C4-C5 level. A bulge with superimposed central disc protrusion is again noted at the C4-C5 level, indenting the thecal sac and not associated with spinal cord compression. Slight disc bulge at C6-C7 without causing canal narrowing, similar to prior. There is no significant neural foraminal narrowing. The previously described ovoid, circumscribed hyperintense vertebral body lesions at the T2 level presumably representing venous vascular malformations (formally called hemangiomas), unchanged. Allowing for the limitations of the technique, no obvious cervical cord signal abnormality is observed. Left turner nasal septum deviation.    Impression: Research study. No acute intracranial findings. Disc degenerative changes, worse at the C4-C5 level, again noted.

## 2024-08-02 LAB
25(OH)D3+25(OH)D2 SERPL-MCNC: 36.9 NG/ML (ref 30–100)
ALBUMIN SERPL-MCNC: 4.2 G/DL (ref 3.9–4.9)
ALP SERPL-CCNC: 50 IU/L (ref 44–121)
ALT SERPL-CCNC: 8 IU/L (ref 0–32)
AST SERPL-CCNC: 13 IU/L (ref 0–40)
BASOPHILS # BLD AUTO: 0 X10E3/UL (ref 0–0.2)
BASOPHILS NFR BLD AUTO: 0 %
BILIRUB SERPL-MCNC: 0.4 MG/DL (ref 0–1.2)
BUN SERPL-MCNC: 10 MG/DL (ref 6–20)
BUN/CREAT SERPL: 13 (ref 9–23)
CALCIUM SERPL-MCNC: 9.5 MG/DL (ref 8.7–10.2)
CHLORIDE SERPL-SCNC: 104 MMOL/L (ref 96–106)
CO2 SERPL-SCNC: 22 MMOL/L (ref 20–29)
CREAT SERPL-MCNC: 0.77 MG/DL (ref 0.57–1)
CRP SERPL-MCNC: <1 MG/L (ref 0–10)
EGFR: 101 ML/MIN/1.73
EOSINOPHIL # BLD AUTO: 0.1 X10E3/UL (ref 0–0.4)
EOSINOPHIL NFR BLD AUTO: 1 %
ERYTHROCYTE [DISTWIDTH] IN BLOOD BY AUTOMATED COUNT: 11.7 % (ref 11.7–15.4)
GLOBULIN SER-MCNC: 2.4 G/DL (ref 1.5–4.5)
GLUCOSE SERPL-MCNC: 98 MG/DL (ref 70–99)
HCT VFR BLD AUTO: 43 % (ref 34–46.6)
HGB BLD-MCNC: 14 G/DL (ref 11.1–15.9)
IMM GRANULOCYTES # BLD: 0 X10E3/UL (ref 0–0.1)
IMM GRANULOCYTES NFR BLD: 0 %
LYMPHOCYTES # BLD AUTO: 2.7 X10E3/UL (ref 0.7–3.1)
LYMPHOCYTES NFR BLD AUTO: 30 %
MCH RBC QN AUTO: 30.8 PG (ref 26.6–33)
MCHC RBC AUTO-ENTMCNC: 32.6 G/DL (ref 31.5–35.7)
MCV RBC AUTO: 95 FL (ref 79–97)
MONOCYTES # BLD AUTO: 0.7 X10E3/UL (ref 0.1–0.9)
MONOCYTES NFR BLD AUTO: 8 %
NEUTROPHILS # BLD AUTO: 5.5 X10E3/UL (ref 1.4–7)
NEUTROPHILS NFR BLD AUTO: 61 %
PLATELET # BLD AUTO: 202 X10E3/UL (ref 150–450)
POTASSIUM SERPL-SCNC: 4.2 MMOL/L (ref 3.5–5.2)
PROT SERPL-MCNC: 6.6 G/DL (ref 6–8.5)
RBC # BLD AUTO: 4.55 X10E6/UL (ref 3.77–5.28)
SODIUM SERPL-SCNC: 141 MMOL/L (ref 134–144)
VIT B12 SERPL-MCNC: 391 PG/ML (ref 232–1245)
WBC # BLD AUTO: 9 X10E3/UL (ref 3.4–10.8)

## 2024-08-12 LAB
INFLIXIMAB AB SERPL-MCNC: <22 NG/ML
INFLIXIMAB SERPL-MCNC: 51 UG/ML

## 2024-08-14 LAB
ADV 40+41 DNA STL QL NAA+NON-PROBE: NOT DETECTED
ASTRO TYP 1-8 RNA STL QL NAA+NON-PROBE: NOT DETECTED
C CAYETANENSIS DNA STL QL NAA+NON-PROBE: NOT DETECTED
C COLI+JEJ+UPSA DNA STL QL NAA+NON-PROBE: NOT DETECTED
C DIF TOX TCDA+TCDB STL QL NAA+NON-PROBE: NOT DETECTED
C DIFF TOX GENS STL QL NAA+PROBE: NEGATIVE
CALPROTECTIN STL-MCNT: 19 UG/G (ref 0–120)
CRYPTOSP DNA STL QL NAA+NON-PROBE: NOT DETECTED
E COLI O157 DNA STL QL NAA+NON-PROBE: NORMAL
E HISTOLYT DNA STL QL NAA+NON-PROBE: NOT DETECTED
EAEC PAA PLAS AGGR+AATA ST NAA+NON-PRB: NOT DETECTED
EC STX1+STX2 GENES STL QL NAA+NON-PROBE: NOT DETECTED
ELASTASE PANC STL-MCNT: >800 UG ELAST./G
EPEC EAE GENE STL QL NAA+NON-PROBE: NOT DETECTED
ETEC LTA+ST1A+ST1B TOX ST NAA+NON-PROBE: NOT DETECTED
FAT STL QL: NORMAL
G LAMBLIA DNA STL QL NAA+NON-PROBE: NOT DETECTED
Lab: NORMAL
NEUTRAL FAT STL QL: NORMAL
NOROVIRUS GI+II RNA STL QL NAA+NON-PROBE: NOT DETECTED
O+P STL CONC: NORMAL
P SHIGELLOIDES DNA STL QL NAA+NON-PROBE: NOT DETECTED
RVA RNA STL QL NAA+NON-PROBE: NOT DETECTED
S ENT+BONG DNA STL QL NAA+NON-PROBE: NOT DETECTED
SAPO I+II+IV+V RNA STL QL NAA+NON-PROBE: NOT DETECTED
SHIGELLA SP+EIEC IPAH ST NAA+NON-PROBE: NOT DETECTED
V CHOL+PARA+VUL DNA STL QL NAA+NON-PROBE: NOT DETECTED
V CHOLERAE DNA STL QL NAA+NON-PROBE: NOT DETECTED
Y ENTEROCOL DNA STL QL NAA+NON-PROBE: NOT DETECTED

## 2024-09-04 ENCOUNTER — TELEPHONE (OUTPATIENT)
Age: 39
End: 2024-09-04

## 2024-09-04 NOTE — TELEPHONE ENCOUNTER
Patients GI provider:  Dr. Spence    Number to return call: [phone]    Reason for call: Melchor from [employer] called needs office notes and renewal order for remicade. Fax number [phone]    Scheduled procedure/appointment date if applicable: 1/6/2025

## 2025-01-23 ENCOUNTER — TELEPHONE (OUTPATIENT)
Age: 40
End: 2025-01-23

## 2025-01-23 NOTE — TELEPHONE ENCOUNTER
Patients GI provider:  Dr. Spence     Number to return call: [phone]    Reason for call: Melchor from [employer] called in requesting a new order for Inflectra. Please fax order to [phone], thank you.  Scheduled procedure/appointment date if applicable: Apt/procedure 02/04/2025

## 2025-01-24 ENCOUNTER — TELEPHONE (OUTPATIENT)
Age: 40
End: 2025-01-24

## 2025-01-24 NOTE — TELEPHONE ENCOUNTER
Returned call to Anitha. Per MANI Holm, via Secure Chat, okay to proceed with Remicade infusion during pregnancy.

## 2025-01-24 NOTE — TELEPHONE ENCOUNTER
Anitha, RN with [facility], reports pt recently learned she is pregnant. She is currently at the infusion center awaiting her Remicade treatment. Anitha is inquiring if it is okay to proceed with the infusion.     Infusion Center: [phone]  Anitha's mobile: [phone]    This encounter will be routed via Secure Chat for urgent response.

## 2025-01-29 ENCOUNTER — OFFICE VISIT (OUTPATIENT)
Dept: GASTROENTEROLOGY | Facility: CLINIC | Age: 40
End: 2025-01-29
Payer: COMMERCIAL

## 2025-01-29 VITALS
SYSTOLIC BLOOD PRESSURE: 98 MMHG | BODY MASS INDEX: 20.25 KG/M2 | WEIGHT: 126 LBS | DIASTOLIC BLOOD PRESSURE: 66 MMHG | HEIGHT: 66 IN

## 2025-01-29 DIAGNOSIS — K50.80 CROHN'S DISEASE OF BOTH SMALL AND LARGE INTESTINE WITHOUT COMPLICATION (HCC): Primary | ICD-10-CM

## 2025-01-29 PROCEDURE — 99214 OFFICE O/P EST MOD 30 MIN: CPT | Performed by: INTERNAL MEDICINE

## 2025-01-29 NOTE — TELEPHONE ENCOUNTER
Melchor from [employer] calling. States Remicade is not covered.  Would need to order Avsola or Inflectra.     Fax to [phone]     [phone]

## 2025-01-29 NOTE — ASSESSMENT & PLAN NOTE
Well-managed. Currently pregnant  - Continue infliximab 10 mg/kg every 6 weeks.  - Comprehensive blood tests for overall health, including antibody levels, kidney, and liver function.  - Infectious workups for infliximab approval.  - Monitor stool studies throughout pregnancy.  - Schedule colonoscopy post-pregnancy.  - Time blood work a few days before infusion, next due March 2025.  - Future medication options include Skyrizi versus Rinvoq versus Stelara   - Continue with infliximab and time last infliximab dose before third trimester.  Avoid live vaccines in child for at least 6 months after birth    Orders:    Calprotectin,Fecal; Future    CBC and differential; Future    Comprehensive metabolic panel; Future    C-reactive protein; Future    Hepatitis B surface antigen; Future    Infliximab Concentration and Anti-Infliximab Antibody; Future    QuantiFERON-TB Gold Plus [facility]; Future

## 2025-01-29 NOTE — PROGRESS NOTES
Name: Belkis Ledezma      : 1985      MRN: [id number]  Encounter Provider: Nicci Cordova MD  Encounter Date: 2025   Encounter department: [facility]    :  Assessment & Plan  Crohn's disease of both small and large intestine without complication (HCC)  Well-managed. Currently pregnant  - Continue infliximab 10 mg/kg every 6 weeks.  - Comprehensive blood tests for overall health, including antibody levels, kidney, and liver function.  - Infectious workups for infliximab approval.  - Monitor stool studies throughout pregnancy.  - Schedule colonoscopy post-pregnancy.  - Time blood work a few days before infusion, next due 2025.  - Future medication options include Skyrizi versus Rinvoq versus Stelara   - Continue with infliximab and time last infliximab dose before third trimester.  Avoid live vaccines in child for at least 6 months after birth    Orders:    Calprotectin,Fecal; Future    CBC and differential; Future    Comprehensive metabolic panel; Future    C-reactive protein; Future    Hepatitis B surface antigen; Future    Infliximab Concentration and Anti-Infliximab Antibody; Future    QuantiFERON-TB Gold Plus [facility]; Future      Avoid live virus vaccines  Yearly flu shot  COVID vaccine and booster  Pneumonia vaccine  Shingrix  Routine skin exams with the dermatologist  Routine Pap smears    Results  - Fecal calprotectin (2024): 19  - MR enterography (): Normal            Chief Complaint   Patient presents with    Follow-up     Pt doing well, yearly check up, pt is currently pregnant, due in December       History of Present Illness  The patient is a 39-year-old female presenting for a follow-up visit. She was last evaluated in 2024 for Crohn's disease.    Crohn's Disease  - Diagnosed at the age of 26  - Most recent fecal calprotectin level in 2024 was 19  - Magnetic resonance enterography and colonoscopy in  were unremarkable  -  "Repeat colonoscopy recommended for 2025  - Reports well-controlled disease with regular bowel movements, absence of hematochezia, and intermittent abdominal pain  - Denies nausea, vomiting, or gastroesophageal reflux symptoms  - Condition remains stable on infliximab therapy    Pregnancy  - Currently 6-7 weeks pregnant  - Previously utilized infliximab during pregnancies, scheduling the last infusion close to the third trimester    Supplemental information: Laboratory tests are conducted at [facility]. Prior treatments included Lialda, Imuran, and Cimzia. Currently receiving infliximab at a dosage of 10 mg/kg every 6 weeks.    MEDICATIONS  - Current: Infliximab  - Past: Lialda, Imuran, Cimzia       Historical Information   Past Medical History:   Diagnosis Date    Crohn's disease (HCC)      Past Surgical History:   Procedure Laterality Date    COLONOSCOPY  12/12/2016    COLONOSCOPY      EGD  06/06/2011    INGUINAL HERNIA REPAIR  1988     Social History     Substance and Sexual Activity   Alcohol Use Yes    Alcohol/week: 2.0 standard drinks of alcohol    Types: 1 Glasses of wine, 1 Cans of beer per week     Social History     Substance and Sexual Activity   Drug Use No     Social History     Tobacco Use   Smoking Status Never   Smokeless Tobacco Never     Family History   Problem Relation Age of Onset    No Known Problems Mother     Hypertension Father     No Known Problems Brother     GI problems Maternal Grandmother     Kidney cancer Maternal Grandfather     No Known Problems Paternal Grandmother     No Known Problems Paternal Grandfather     Colon polyps Neg Hx     Colon cancer Neg Hx        Meds/Allergies     Current Outpatient Medications:     inFLIXimab (REMICADE) 100 mg    MULTIPLE VITAMIN PO    dicyclomine (BENTYL) 10 mg capsule  No Known Allergies    PHYSICAL EXAM:    Blood pressure 98/66, height 5' 6\" (1.676 m), weight 57.2 kg (126 lb), unknown if currently breastfeeding. Body mass index is 20.34 " "kg/m².  Physical Exam  - Abdomen examined  General Appearance: No apparent distress, cooperative, alert.  Eyes: Anicteric.  Gastrointestinal: Soft, non-tender, non-distended; normal bowel sounds; no masses, no organomegaly.    Rectal: Deferred.  Musculoskeletal: No edema.  Skin: No jaundice.     OTHER LAB RESULTS:   Lab Results   Component Value Date    WBC 9.0 08/01/2024    WBC 7.0 01/22/2024    WBC 7.9 10/13/2023    HGB 14.0 08/01/2024    HGB 13.8 01/22/2024    HGB 15.2 10/13/2023    MCV 95 08/01/2024     08/01/2024     01/22/2024     10/13/2023     Lab Results   Component Value Date    K 4.2 08/01/2024     08/01/2024    CO2 22 08/01/2024    BUN 10 08/01/2024    CREATININE 0.77 08/01/2024    CALCIUM 9.4 01/22/2024    AST 13 08/01/2024    AST 11 01/22/2024    AST 18 10/13/2023    ALT 8 08/01/2024    ALT 7 01/22/2024    ALT 10 10/13/2023    ALKPHOS 54 01/22/2024    ALKPHOS 57 10/13/2023    ALKPHOS 56 05/11/2022    ALB 4.2 08/01/2024    TBILI 0.4 08/01/2024    TBILI 0.6 01/22/2024    TBILI 0.7 10/13/2023    EGFR 101 08/01/2024     No results found for: \"IRON\", \"TIBC\", \"FERRITIN\"  No results found for: \"LIPASE\"    OTHER RADIOLOGY RESULTS:   No results found.  "

## 2025-02-25 ENCOUNTER — TRANSCRIBE ORDERS (OUTPATIENT)
Dept: PERINATAL CARE | Facility: OTHER | Age: 40
End: 2025-02-25

## 2025-02-25 DIAGNOSIS — Z34.90 ENCOUNTER FOR SUPERVISION OF NORMAL PREGNANCY, ANTEPARTUM, UNSPECIFIED GRAVIDITY: Primary | ICD-10-CM

## 2025-03-13 ENCOUNTER — TELEPHONE (OUTPATIENT)
Age: 40
End: 2025-03-13

## 2025-04-11 ENCOUNTER — ROUTINE PRENATAL (OUTPATIENT)
Dept: PERINATAL CARE | Facility: OTHER | Age: 40
End: 2025-04-11
Payer: COMMERCIAL

## 2025-04-11 VITALS
WEIGHT: 139.8 LBS | HEIGHT: 66 IN | HEART RATE: 104 BPM | SYSTOLIC BLOOD PRESSURE: 90 MMHG | BODY MASS INDEX: 22.47 KG/M2 | DIASTOLIC BLOOD PRESSURE: 66 MMHG

## 2025-04-11 DIAGNOSIS — K50.90 MATERNAL CROHN'S DISEASE AFFECTING PREGNANCY IN SECOND TRIMESTER (HCC): ICD-10-CM

## 2025-04-11 DIAGNOSIS — O99.612 MATERNAL CROHN'S DISEASE AFFECTING PREGNANCY IN SECOND TRIMESTER (HCC): ICD-10-CM

## 2025-04-11 DIAGNOSIS — O20.9 FIRST TRIMESTER BLEEDING: ICD-10-CM

## 2025-04-11 DIAGNOSIS — Q99.9 CHROMOSOMAL ABNORMALITY: ICD-10-CM

## 2025-04-11 DIAGNOSIS — O09.522 SUPERVISION OF ELDERLY MULTIGRAVIDA, SECOND TRIMESTER: ICD-10-CM

## 2025-04-11 DIAGNOSIS — O35.2XX0 HEREDITARY DISEASE IN FAMILY POSSIBLY AFFECTING FETUS, AFFECTING MANAGEMENT OF MOTHER IN PREGNANCY, SINGLE OR UNSPECIFIED FETUS: ICD-10-CM

## 2025-04-11 DIAGNOSIS — Z3A.17 17 WEEKS GESTATION OF PREGNANCY: ICD-10-CM

## 2025-04-11 DIAGNOSIS — Z03.71 SUSPECTED PROBLEM WITH AMNIOTIC CAVITY AND MEMBRANE NOT FOUND: Primary | ICD-10-CM

## 2025-04-11 PROCEDURE — 76811 OB US DETAILED SNGL FETUS: CPT | Performed by: OBSTETRICS & GYNECOLOGY

## 2025-04-11 PROCEDURE — 99205 OFFICE O/P NEW HI 60 MIN: CPT | Performed by: OBSTETRICS & GYNECOLOGY

## 2025-04-11 RX ORDER — AMOXICILLIN 200 MG/5ML
POWDER, FOR SUSPENSION ORAL 2 TIMES DAILY
COMMUNITY

## 2025-04-11 RX ORDER — INFLIXIMAB-DYYB 100 MG/10ML
INJECTION, POWDER, LYOPHILIZED, FOR SOLUTION INTRAVENOUS
COMMUNITY

## 2025-04-11 NOTE — LETTER
2025     Miri Arevalo MD  [address] [facility]  [address]  [facility] [address]    Patient: Belkis Ledezma   YOB: 1985   Date of Visit: 2025       Dear Dr. Miri Arevalo MD:    Thank you for referring Belkis Ledezma to me for evaluation. Below are my notes for this consultation.    If you have questions, please do not hesitate to call me. I look forward to following your patient along with you.         Sincerely,        Ana Hsieh MD        CC: No Recipients    Ana Hsieh MD  2025  1:39 PM  Sign when Signing Visit  OFFICE CONSULT  Miri Arevalo Md  [address] [facility]  [address]  [place] [address]     Dear Dr. Arevalo     Thank you for requesting a  consultation on your patient Belkis Ledezma for the following indications: An outside ultrasound suggested that this pregnancy had oligohydramnios.  Belkis is here at today's visit with her partner Michelle Gnozalez.     History  Past medical history: Crohn's disease, maternal variant of unknown significance [id number]  Past surgical history: None  Medications: Multivitamin, infliximab, amoxicillin  Allergies to medications: None  Past obstetrical history: 2018 at 42 weeks she had an 8 pound 12 ounce baby girl vaginally and required a blood transfusion  10/5/2020 at 40 weeks she had an 8 pound baby girl vaginally.  After birth her child required a NICU admission for respiratory distress and ultimately was found to be a carrier for ABCA3 surfactant deficiency transferred to [facility] and  at age 4. She needed a lung transplant to survive. Variants of uncertain significance were identified in the ABCA3 gene (paternal variant - [id number]_1173dup, maternal variants - c.4244T>C and c.367G>A).   Social history: Reports no substance use  First generation family history: As stated above her second daughter has a ABCA3 surfactant deficiency and her father has hypertension.    Belkis had vaginal  bleeding early in this pregnancy that delayed her CVS.  She then had further bleeding that filled a pad after her CVS. She was diagnosed with a subchorionic hemorrhage.  Bleeding stopped approximately 2.5 weeks ago. A follow-up ultrasound in radiology on 25 suggested there was oligohydramnios and her OB provider did a speculum exam which showed no evidence of PPROM.    Patient reports her CVS returned showing that this pregnancy is not going to have the surfactant deficiency that her second daughter has but I can not see the formal results in care everywhere.    Early Glucola of 116    Ultrasound findings: Today's ultrasound shows a fetus that is growing concordant with her dates.  Amniotic fluid appears normal in amount.  No malformations were detected on today's early anatomy scan.  Views of the cavum are limited secondary to early gestational age.    The patient was informed of the findings and counseled about the limitations of the exam in detecting all forms of fetal congenital abnormalities.    She does not report any vaginal bleeding or uterine cramping/contractions. She does feel fetal movement.      Specific counseling was provided on the following problems:  1.  She reports her Crohn's disease is under good control and she is planning on stopping her infliximab at the end of her second trimester so that she is not on medication prior to delivery and there is less of a risk for  immunosuppression.  The most common opinion is to continue her infliximab till just before delivery so that she does not develop a exacerbation of her Crohn's which can cause an increased risk for  labor and fetal growth restriction.  The timing of when to give the rotavirus vaccination after birth varies between getting in at the same time as originally scheduled  (6-12 weeks of age) versus waiting till 9 months after birth.      Will send her a link to of the mother to baby website Infliximab - MotherToBaby that  reports no changes needed to the regular vaccination schedule other than live attenuated vaccines of which there is only 1 that is given in the first 12 months of pregnancy which is called rotavirus.  This article does not give specific timing of when to give the rotavirus vaccine.    The article below .   Safety of Live-Attenuated Vaccines in Children Exposed to Biologic Response Modifiers in Utero   Erich Montalvo, PhD; Whit Brantley MD, MPH; Sheila Lara, MPH; Chuy Birch, MPH; Blanquita Ambrosio MD, PhD, MPH; Sheila Orona, ScD; Julia Burton, PhD, MPH; Brandyn Gama, MS; KWADWO NowakM, PhD; Jeramy Mota MD; Belkis Hall MD, MPH; MORRIS Desai MD; Alexsander Tejeda MD, MPH; Sivakumar Guardado MD, PhD    This paper states that adverse events of special interest were similar in babies whose mothers who were given live attenuated vaccines in both mothers who were exposed to biologic response modifiers and those not exposed to biologic response modifiers.    Another resource was written in Up To Date with the following opinions below:    Infliximab -- Infliximab is compatible with use during conception in male and female patients and throughout pregnancy. Rates of live births, miscarriages, and therapeutic terminations do not appear to be significantly different in patients exposed to infliximab during pregnancy compared with the general population.    Infliximab should be continued throughout pregnancy to avoid flares. Discontinuation of therapy can lead to increased disease activity and increased adverse outcomes to the child. While there is no formal need for adjustment, generally the last dose during pregnancy is given at least two weeks prior to anticipated delivery. Infliximab is considered compatible with breastfeeding as it is excreted in trace amounts in human milk and is poorly absorbed orally, thus reducing the potential for systemic adverse effects.    Infliximab  crosses the placenta. The exact timing is unclear, but in general, maternal antibodies are actively transported across the placenta by the  Fc receptor (FcRn) beginning in the early to mid-second trimester, with the highest fetal concentration in the third trimester near term. High levels of infliximab have been detected in the cord blood of newborns, and this raises concern about an increased risk of infection and a suboptimal response to vaccinations in the . However, in a cohort study including 179 pregnant patients with IBD, infants born to mothers on biologic therapy did not have lower rates of adequate serologic response to Haemophilus influenza B (HiB) or tetanus toxin following vaccination compared with infants unexposed to biologic therapy (HiB group: 71 versus 50 percent, and tetanus toxoid group: 80 versus 75 percent). In addition, the median cord blood concentrations of infliximab were similar in infants with an adequate serologic response to the vaccines compared with infants without an adequate response.    The expert of this article recommended that live vaccines should not be given to infants exposed to infliximab, adalimumab, golimumab, or natalizumab, as newborns may have detectable serum levels of drug for up to nine months. All other vaccinations can be given on schedule.    Follow up recommended:   She has a follow-up ultrasound for anatomy at 20 weeks.  Since we saw the anatomy today for this emergent ultrasound, her return visit will be for growth, missed anatomy, repeat heart views at a more optimal gestational age and cervical length.     Pre visit time reviewing her records  15 minutes  Face to face time 15 minutes  Post visit time on documentation of note, updating her problem list, adding orders and prescriptions 40 minutes.  Procedures that were completed today were charged separately.   The level of decision making was high complexity.    Ana Hsieh MD

## 2025-04-11 NOTE — PROGRESS NOTES
OFFICE CONSULT  Miri Arevalo Md  [address] [facility]  [address]  [place] [address]     Dear Dr. Arevalo     Thank you for requesting a  consultation on your patient Belkis Ledezma for the following indications: An outside ultrasound suggested that this pregnancy had oligohydramnios.  Belkis is here at today's visit with her partner Michelle Gonzalez.     History  Past medical history: Crohn's disease, maternal variant of unknown significance GRCH37  Past surgical history: None  Medications: Multivitamin, infliximab, amoxicillin  Allergies to medications: None  Past obstetrical history: 2018 at 42 weeks she had an 8 pound 12 ounce baby girl vaginally and required a blood transfusion  10/5/2020 at 40 weeks she had an 8 pound baby girl vaginally.  After birth her child required a NICU admission for respiratory distress and ultimately was found to be a carrier for ABCA3 surfactant deficiency transferred to [facility] and  at age 4. She needed a lung transplant to survive. Variants of uncertain significance were identified in the ABCA3 gene (paternal variant - [id number], maternal variants - c.4244T>C and c.367G>A).   Social history: Reports no substance use  First generation family history: As stated above her second daughter has a ABCA3 surfactant deficiency and her father has hypertension.    Belkis had vaginal bleeding early in this pregnancy that delayed her CVS.  She then had further bleeding that filled a pad after her CVS. She was diagnosed with a subchorionic hemorrhage.  Bleeding stopped approximately 2.5 weeks ago. A follow-up ultrasound in radiology on 25 suggested there was oligohydramnios and her OB provider did a speculum exam which showed no evidence of PPROM.    Patient reports her CVS returned showing that this pregnancy is not going to have the surfactant deficiency that her second daughter has but I can not see the formal results in care everywhere.    Early Glucola of  116    Ultrasound findings: Today's ultrasound shows a fetus that is growing concordant with her dates.  Amniotic fluid appears normal in amount.  No malformations were detected on today's early anatomy scan.  Views of the cavum are limited secondary to early gestational age.    The patient was informed of the findings and counseled about the limitations of the exam in detecting all forms of fetal congenital abnormalities.    She does not report any vaginal bleeding or uterine cramping/contractions. She does feel fetal movement.      Specific counseling was provided on the following problems:  1.  She reports her Crohn's disease is under good control and she is planning on stopping her infliximab at the end of her second trimester so that she is not on medication prior to delivery and there is less of a risk for  immunosuppression.  The most common opinion is to continue her infliximab till just before delivery so that she does not develop a exacerbation of her Crohn's which can cause an increased risk for  labor and fetal growth restriction.  The timing of when to give the rotavirus vaccination after birth varies between getting in at the same time as originally scheduled  (6-12 weeks of age) versus waiting till 9 months after birth.      Will send her a link to of the mother to baby website Infliximab - MotherToBaby that reports no changes needed to the regular vaccination schedule other than live attenuated vaccines of which there is only 1 that is given in the first 12 months of pregnancy which is called rotavirus.  This article does not give specific timing of when to give the rotavirus vaccine.    The article below .   Safety of Live-Attenuated Vaccines in Children Exposed to Biologic Response Modifiers in Utero   Erich Montalvo, PhD; Whit Brantley MD, MPH; Sheila Lara, MPH; Chuy Birch, MPH; Blanquita Ambrosio MD, PhD, MPH; Sheila Orona, ScD; Julia Burton, PhD, MPH; Brandyn  MS Natan; Yeyo Jackman DVM, PhD; Jeramy Mota MD; Belkis Hall MD, MPH; MORRIS Desai MD; Alexsander Tejeda MD, MPH; Sivakumar Guardado MD, PhD    This paper states that adverse events of special interest were similar in babies whose mothers who were given live attenuated vaccines in both mothers who were exposed to biologic response modifiers and those not exposed to biologic response modifiers.    Another resource was written in Up To Date with the following opinions below:    Infliximab -- Infliximab is compatible with use during conception in male and female patients and throughout pregnancy. Rates of live births, miscarriages, and therapeutic terminations do not appear to be significantly different in patients exposed to infliximab during pregnancy compared with the general population.    Infliximab should be continued throughout pregnancy to avoid flares. Discontinuation of therapy can lead to increased disease activity and increased adverse outcomes to the child. While there is no formal need for adjustment, generally the last dose during pregnancy is given at least two weeks prior to anticipated delivery. Infliximab is considered compatible with breastfeeding as it is excreted in trace amounts in human milk and is poorly absorbed orally, thus reducing the potential for systemic adverse effects.    Infliximab crosses the placenta. The exact timing is unclear, but in general, maternal antibodies are actively transported across the placenta by the  Fc receptor (FcRn) beginning in the early to mid-second trimester, with the highest fetal concentration in the third trimester near term. High levels of infliximab have been detected in the cord blood of newborns, and this raises concern about an increased risk of infection and a suboptimal response to vaccinations in the . However, in a cohort study including 179 pregnant patients with IBD, infants born to mothers on biologic therapy  did not have lower rates of adequate serologic response to Haemophilus influenza B (HiB) or tetanus toxin following vaccination compared with infants unexposed to biologic therapy (HiB group: 71 versus 50 percent, and tetanus toxoid group: 80 versus 75 percent). In addition, the median cord blood concentrations of infliximab were similar in infants with an adequate serologic response to the vaccines compared with infants without an adequate response.    The expert of this article recommended that live vaccines should not be given to infants exposed to infliximab, adalimumab, golimumab, or natalizumab, as newborns may have detectable serum levels of drug for up to nine months. All other vaccinations can be given on schedule.    Follow up recommended:   She has a follow-up ultrasound for anatomy at 20 weeks.  Since we saw the anatomy today for this emergent ultrasound, her return visit will be for growth, missed anatomy, repeat heart views at a more optimal gestational age and cervical length.     Pre visit time reviewing her records  15 minutes  Face to face time 15 minutes  Post visit time on documentation of note, updating her problem list, adding orders and prescriptions 40 minutes.  Procedures that were completed today were charged separately.   The level of decision making was high complexity.    Ana Hsieh MD

## 2025-04-11 NOTE — LETTER
2025     Miri Arevlao MD  [address] [facility]  [address]  [facility] [address]    Patient: Belkis Ledezma   YOB: 1985   Date of Visit: 2025       Dear Dr. Miri Arevalo MD:    Thank you for referring Belkis Ledezma to me for evaluation. Below are my notes for this consultation.    If you have questions, please do not hesitate to call me. I look forward to following your patient along with you.         Sincerely,        Ana Hsieh MD        CC: No Recipients    Ana Hsieh MD  2025  1:39 PM  Sign when Signing Visit  OFFICE CONSULT  Miri Arevalo Md  [address] [facility]  [address]  [place] [address]     Dear Dr. Arevalo     Thank you for requesting a  consultation on your patient Belkis Ledezma for the following indications: An outside ultrasound suggested that this pregnancy had oligohydramnios.  Belkis is here at today's visit with her partner Michelle Gonzalez.     History  Past medical history: Crohn's disease, maternal variant of unknown significance GRCH37  Past surgical history: None  Medications: Multivitamin, infliximab, amoxicillin  Allergies to medications: None  Past obstetrical history: 2018 at 42 weeks she had an 8 pound 12 ounce baby girl vaginally and required a blood transfusion  10/5/2020 at 40 weeks she had an 8 pound baby girl vaginally.  After birth her child required a NICU admission for respiratory distress and ultimately was found to be a carrier for ABCA3 surfactant deficiency transferred to [facility] and  at age 4. She needed a lung transplant to survive. Variants of uncertain significance were identified in the ABCA3 gene (paternal variant - [id number]_1173dup, maternal variants - c.4244T>C and c.367G>A).   Social history: Reports no substance use  First generation family history: As stated above her second daughter has a ABCA3 surfactant deficiency and her father has hypertension.    Belkis had vaginal  bleeding early in this pregnancy that delayed her CVS.  She then had further bleeding that filled a pad after her CVS. She was diagnosed with a subchorionic hemorrhage.  Bleeding stopped approximately 2.5 weeks ago. A follow-up ultrasound in radiology on 25 suggested there was oligohydramnios and her OB provider did a speculum exam which showed no evidence of PPROM.    Patient reports her CVS returned showing that this pregnancy is not going to have the surfactant deficiency that her second daughter has but I can not see the formal results in care everywhere.    Early Glucola of 116    Ultrasound findings: Today's ultrasound shows a fetus that is growing concordant with her dates.  Amniotic fluid appears normal in amount.  No malformations were detected on today's early anatomy scan.  Views of the cavum are limited secondary to early gestational age.    The patient was informed of the findings and counseled about the limitations of the exam in detecting all forms of fetal congenital abnormalities.    She does not report any vaginal bleeding or uterine cramping/contractions. She does feel fetal movement.      Specific counseling was provided on the following problems:  1.  She reports her Crohn's disease is under good control and she is planning on stopping her infliximab at the end of her second trimester so that she is not on medication prior to delivery and there is less of a risk for  immunosuppression.  The most common opinion is to continue her infliximab till just before delivery so that she does not develop a exacerbation of her Crohn's which can cause an increased risk for  labor and fetal growth restriction.  The timing of when to give the rotavirus vaccination after birth varies between getting in at the same time as originally scheduled  (6-12 weeks of age) versus waiting till 9 months after birth.      Will send her a link to of the mother to baby website Infliximab - MotherToBaby that  reports no changes needed to the regular vaccination schedule other than live attenuated vaccines of which there is only 1 that is given in the first 12 months of pregnancy which is called rotavirus.  This article does not give specific timing of when to give the rotavirus vaccine.    The article below .   Safety of Live-Attenuated Vaccines in Children Exposed to Biologic Response Modifiers in Utero   Erich Montalvo, PhD; Whit Brantley MD, MPH; Sheila Lara, MPH; Chuy Birch, MPH; Blanquita Ambrosio MD, PhD, MPH; Sheila Orona, ScD; Julia Burton, PhD, MPH; Brandyn Gama, MS; KWADWO NowakM, PhD; Jeramy Mota MD; Belkis Hall MD, MPH; MORRIS Desai MD; Alexsander Tejeda MD, MPH; Sivakumar Guardado MD, PhD    This paper states that adverse events of special interest were similar in babies whose mothers who were given live attenuated vaccines in both mothers who were exposed to biologic response modifiers and those not exposed to biologic response modifiers.    Another resource was written in Up To Date with the following opinions below:    Infliximab -- Infliximab is compatible with use during conception in male and female patients and throughout pregnancy. Rates of live births, miscarriages, and therapeutic terminations do not appear to be significantly different in patients exposed to infliximab during pregnancy compared with the general population.    Infliximab should be continued throughout pregnancy to avoid flares. Discontinuation of therapy can lead to increased disease activity and increased adverse outcomes to the child. While there is no formal need for adjustment, generally the last dose during pregnancy is given at least two weeks prior to anticipated delivery. Infliximab is considered compatible with breastfeeding as it is excreted in trace amounts in human milk and is poorly absorbed orally, thus reducing the potential for systemic adverse effects.    Infliximab  crosses the placenta. The exact timing is unclear, but in general, maternal antibodies are actively transported across the placenta by the  Fc receptor (FcRn) beginning in the early to mid-second trimester, with the highest fetal concentration in the third trimester near term. High levels of infliximab have been detected in the cord blood of newborns, and this raises concern about an increased risk of infection and a suboptimal response to vaccinations in the . However, in a cohort study including 179 pregnant patients with IBD, infants born to mothers on biologic therapy did not have lower rates of adequate serologic response to Haemophilus influenza B (HiB) or tetanus toxin following vaccination compared with infants unexposed to biologic therapy (HiB group: 71 versus 50 percent, and tetanus toxoid group: 80 versus 75 percent). In addition, the median cord blood concentrations of infliximab were similar in infants with an adequate serologic response to the vaccines compared with infants without an adequate response.    The expert of this article recommended that live vaccines should not be given to infants exposed to infliximab, adalimumab, golimumab, or natalizumab, as newborns may have detectable serum levels of drug for up to nine months. All other vaccinations can be given on schedule.    Follow up recommended:   She has a follow-up ultrasound for anatomy at 20 weeks.  Since we saw the anatomy today for this emergent ultrasound, her return visit will be for growth, missed anatomy, repeat heart views at a more optimal gestational age and cervical length.     Pre visit time reviewing her records  15 minutes  Face to face time 15 minutes  Post visit time on documentation of note, updating her problem list, adding orders and prescriptions 40 minutes.  Procedures that were completed today were charged separately.   The level of decision making was high complexity.    Ana Hsieh MD

## 2025-04-13 PROBLEM — O20.9 FIRST TRIMESTER BLEEDING: Status: ACTIVE | Noted: 2025-04-13

## 2025-04-13 PROBLEM — O35.2XX0 HEREDITARY DISEASE IN FAMILY POSSIBLY AFFECTING FETUS: Status: ACTIVE | Noted: 2025-04-13

## 2025-04-13 PROBLEM — Q99.9 CHROMOSOMAL ABNORMALITY: Status: ACTIVE | Noted: 2025-04-13

## 2025-04-13 PROBLEM — O09.522 SUPERVISION OF ELDERLY MULTIGRAVIDA, SECOND TRIMESTER: Status: ACTIVE | Noted: 2025-04-13

## 2025-04-24 ENCOUNTER — TELEPHONE (OUTPATIENT)
Dept: GASTROENTEROLOGY | Facility: CLINIC | Age: 40
End: 2025-04-24

## 2025-04-24 NOTE — TELEPHONE ENCOUNTER
Erick, RN with [employer], is inquiring if pt's Avsola dose will be titrated up as pt's weight increases during the pregnancy. Pt is 64 kg as of today and at 19 weeks. Last dose of Avsola was 640 mg; next dose is due in 6 weeks. IVX is closed 04/25. Erick is on vacation the week of 04/28. Recommendations may be given to the RN team.    Phone: [phone]  Fax: [phone]

## 2025-04-29 NOTE — TELEPHONE ENCOUNTER
From: Lisandro Ardon PA-C  Sent: 4/25/2025   8:17 AM EDT  To: Maria Alejandra AQUINO RN    It is appropriate to adjust dose per current weight

## 2025-04-29 NOTE — TELEPHONE ENCOUNTER
Called [employer] [facility], reached voicemail, left message returning call rand to ask for Tania- no further details provided on voicemail. Will call 4/30 if [facility] does not return call.

## 2025-04-30 NOTE — TELEPHONE ENCOUNTER
Called "[employer] reached office, message left for nursing staff to call our office regarding Avsola orders.

## 2025-04-30 NOTE — TELEPHONE ENCOUNTER
I spoke with Kelly from [employer] and relayed Lisandro Ardon PA-C message it is appropriate to adjust dose per current weight. Pt to continue with current orders. No further questions at this time.

## 2025-05-07 ENCOUNTER — ROUTINE PRENATAL (OUTPATIENT)
Age: 40
End: 2025-05-07
Attending: OBSTETRICS & GYNECOLOGY
Payer: COMMERCIAL

## 2025-05-07 VITALS
DIASTOLIC BLOOD PRESSURE: 54 MMHG | WEIGHT: 143 LBS | BODY MASS INDEX: 22.98 KG/M2 | HEART RATE: 89 BPM | SYSTOLIC BLOOD PRESSURE: 98 MMHG | HEIGHT: 66 IN

## 2025-05-07 DIAGNOSIS — K50.90 MATERNAL CROHN'S DISEASE AFFECTING PREGNANCY IN SECOND TRIMESTER (HCC): ICD-10-CM

## 2025-05-07 DIAGNOSIS — O99.612 MATERNAL CROHN'S DISEASE AFFECTING PREGNANCY IN SECOND TRIMESTER (HCC): ICD-10-CM

## 2025-05-07 DIAGNOSIS — Z3A.21 21 WEEKS GESTATION OF PREGNANCY: Primary | ICD-10-CM

## 2025-05-07 DIAGNOSIS — O35.2XX0 HEREDITARY DISEASE IN FAMILY POSSIBLY AFFECTING FETUS, AFFECTING MANAGEMENT OF MOTHER IN PREGNANCY, SINGLE OR UNSPECIFIED FETUS: ICD-10-CM

## 2025-05-07 DIAGNOSIS — Z34.90 ENCOUNTER FOR SUPERVISION OF NORMAL PREGNANCY, ANTEPARTUM, UNSPECIFIED GRAVIDITY: ICD-10-CM

## 2025-05-07 DIAGNOSIS — K50.80 CROHN'S DISEASE OF BOTH SMALL AND LARGE INTESTINE WITHOUT COMPLICATION (HCC): ICD-10-CM

## 2025-05-07 DIAGNOSIS — O09.522 SUPERVISION OF ELDERLY MULTIGRAVIDA, SECOND TRIMESTER: ICD-10-CM

## 2025-05-07 PROCEDURE — 76817 TRANSVAGINAL US OBSTETRIC: CPT | Performed by: OBSTETRICS & GYNECOLOGY

## 2025-05-07 PROCEDURE — 76811 OB US DETAILED SNGL FETUS: CPT | Performed by: OBSTETRICS & GYNECOLOGY

## 2025-05-07 PROCEDURE — 99214 OFFICE O/P EST MOD 30 MIN: CPT | Performed by: OBSTETRICS & GYNECOLOGY

## 2025-05-07 NOTE — PROGRESS NOTES
A fetal ultrasound was completed. See Ob procedures in Epic for an interpretation and recommendations. Do not hesitate to contact us in [facility] if you have questions.    Rachid Gamboa MD, MSCE  Maternal Fetal Medicine

## 2025-05-07 NOTE — LETTER
May 7, 2025     Miri Marmolejo MD  [address]  [address]  [place] [address]    Patient: Belkis Ledezma   YOB: 1985   Date of Visit: 5/7/2025       Dear Dr. Miri Marmolejo MD:    Thank you for referring Belkis Ledezma to me for evaluation. Below are my notes for this consultation.    If you have questions, please do not hesitate to call me. I look forward to following your patient along with you.         Sincerely,        Rachid Gamboa MD        CC: No Recipients    Rachid Gamboa MD  5/7/2025  2:45 PM  Sign when Signing Visit  A fetal ultrasound was completed. See Ob procedures in Epic for an interpretation and recommendations. Do not hesitate to contact us in [facility] if you have questions.    Rachid Gamboa MD, MSCE  Maternal Fetal Medicine

## 2025-05-07 NOTE — PROGRESS NOTES
Ultrasound Probe Disinfection    A transvaginal ultrasound was performed.   Prior to use, disinfection was performed with High Level Disinfection Process ([employer]).  Probe serial number [id number]: [id number] was used.    Chaperone: Jory Mcmahan RDMS

## 2025-06-16 ENCOUNTER — ANCILLARY PROCEDURE (OUTPATIENT)
Dept: PERINATAL CARE | Facility: OTHER | Age: 40
End: 2025-06-16
Attending: OBSTETRICS & GYNECOLOGY
Payer: COMMERCIAL

## 2025-06-16 ENCOUNTER — ULTRASOUND (OUTPATIENT)
Dept: PERINATAL CARE | Facility: OTHER | Age: 40
End: 2025-06-16
Payer: COMMERCIAL

## 2025-06-16 VITALS
SYSTOLIC BLOOD PRESSURE: 108 MMHG | DIASTOLIC BLOOD PRESSURE: 60 MMHG | WEIGHT: 152.6 LBS | BODY MASS INDEX: 24.53 KG/M2 | HEART RATE: 99 BPM | HEIGHT: 66 IN

## 2025-06-16 DIAGNOSIS — Z3A.26 26 WEEKS GESTATION OF PREGNANCY: ICD-10-CM

## 2025-06-16 DIAGNOSIS — Z3A.27 27 WEEKS GESTATION OF PREGNANCY: ICD-10-CM

## 2025-06-16 DIAGNOSIS — K50.90 MATERNAL CROHN'S DISEASE AFFECTING PREGNANCY IN SECOND TRIMESTER (HCC): Primary | ICD-10-CM

## 2025-06-16 DIAGNOSIS — O09.522 SUPERVISION OF ELDERLY MULTIGRAVIDA, SECOND TRIMESTER: ICD-10-CM

## 2025-06-16 DIAGNOSIS — O99.612 MATERNAL CROHN'S DISEASE AFFECTING PREGNANCY IN SECOND TRIMESTER (HCC): Primary | ICD-10-CM

## 2025-06-16 PROCEDURE — 76816 OB US FOLLOW-UP PER FETUS: CPT | Performed by: OBSTETRICS & GYNECOLOGY

## 2025-06-16 PROCEDURE — 99212 OFFICE O/P EST SF 10 MIN: CPT | Performed by: OBSTETRICS & GYNECOLOGY

## 2025-06-16 RX ORDER — PNV NO.95/FERROUS FUM/FOLIC AC 28MG-0.8MG
1 TABLET ORAL DAILY
Start: 2025-06-16

## 2025-06-16 NOTE — PROGRESS NOTES
Belkis Ledezma  has no complaints today at 27w1d. She reports fetal movements and does not report any vaginal bleeding or signs of labor.   She is here today for an ultrasound for fetal growth.    Problem list:  Crohn's under good control with normal bowel movements on infliximab.  Prior pregnancies weighed 8 pounds or greater at birth.  AMA  Hereditary disease in the family-[facility] by patient report shows that this daughter is not going to have the ABCA3  surfactant deficiency that her second daughter had.   Ultrasound after her CVS suggested fluid was low which resolved and she had no evidence for PPROM.     Ultrasound findings:  The ultrasound today shows normal interval fetal growth and GINO today is normal at 14 cm.    Pregnancy ultrasound has limitations and is unable to detect all forms of fetal congenital abnormalities.      Follow up recommended:   No further growth scans are recommended at this time.    Pre visit time reviewing her records   5 minutes  Face to face time 5 minutes  Post visit time on documentation of note, updating her problem list, adding orders and prescriptions 5 minutes.  Procedures that were completed today were charged separately.   The level of decision making was straight forward.    Ana Hsieh MD

## 2025-06-16 NOTE — LETTER
June 16, 2025     Miri Marmolejo MD  [address]  [address]  [facility] [address]    Patient: Belkis Ledezma   YOB: 1985   Date of Visit: 6/16/2025       Dear Dr. Miri Marmolejo MD:    Thank you for referring Belkis Ledezma to me for evaluation. Below are my notes for this consultation.    If you have questions, please do not hesitate to call me. I look forward to following your patient along with you.         Sincerely,        Ana Hsieh MD        CC: No Recipients    Ana Hsieh MD  6/16/2025 10:40 AM  Sign when Signing Visit  Belkis Ledezma  has no complaints today at 27w1d. She reports fetal movements and does not report any vaginal bleeding or signs of labor.   She is here today for an ultrasound for fetal growth.    Problem list:  Crohn's under good control with normal bowel movements on infliximab.  Prior pregnancies weighed 8 pounds or greater at birth.  AMA  Hereditary disease in the family-[facility] by patient report shows that this daughter is not going to have the ABCA3  surfactant deficiency that her second daughter had.   Ultrasound after her CVS suggested fluid was low which resolved and she had no evidence for PPROM.     Ultrasound findings:  The ultrasound today shows normal interval fetal growth and GINO today is normal at 14 cm.    Pregnancy ultrasound has limitations and is unable to detect all forms of fetal congenital abnormalities.      Follow up recommended:   No further growth scans are recommended at this time.    Pre visit time reviewing her records   5 minutes  Face to face time 5 minutes  Post visit time on documentation of note, updating her problem list, adding orders and prescriptions 5 minutes.  Procedures that were completed today were charged separately.   The level of decision making was straight forward.    Ana Hsieh MD

## 2025-08-01 ENCOUNTER — OB ABSTRACT (OUTPATIENT)
Age: 40
End: 2025-08-01

## 2025-08-04 PROBLEM — Z80.3 FAMILY HISTORY OF BREAST CANCER: Status: ACTIVE | Noted: 2025-08-04

## 2025-08-04 PROBLEM — O09.529 AMA (ADVANCED MATERNAL AGE) MULTIGRAVIDA 35+: Status: ACTIVE | Noted: 2025-08-04

## 2025-08-04 PROBLEM — Z80.0 FAMILY HISTORY OF COLON CANCER: Status: ACTIVE | Noted: 2025-08-04

## 2025-08-05 ENCOUNTER — ROUTINE PRENATAL (OUTPATIENT)
Age: 40
End: 2025-08-05

## 2025-08-05 VITALS — WEIGHT: 157.8 LBS | DIASTOLIC BLOOD PRESSURE: 52 MMHG | SYSTOLIC BLOOD PRESSURE: 96 MMHG | BODY MASS INDEX: 25.47 KG/M2

## 2025-08-05 DIAGNOSIS — K50.80 CROHN'S DISEASE OF BOTH SMALL AND LARGE INTESTINE WITHOUT COMPLICATION (HCC): ICD-10-CM

## 2025-08-05 DIAGNOSIS — Z3A.34 34 WEEKS GESTATION OF PREGNANCY: Primary | ICD-10-CM

## 2025-08-05 DIAGNOSIS — O09.522 SUPERVISION OF ELDERLY MULTIGRAVIDA, SECOND TRIMESTER: ICD-10-CM

## 2025-08-05 PROCEDURE — PNV: Performed by: OBSTETRICS & GYNECOLOGY

## 2025-08-05 RX ORDER — INFLIXIMAB-AXXQ 100 MG/10ML
INJECTION, POWDER, LYOPHILIZED, FOR SOLUTION INTRAVENOUS
COMMUNITY

## 2025-08-08 ENCOUNTER — NURSE TRIAGE (OUTPATIENT)
Age: 40
End: 2025-08-08

## 2025-08-08 ENCOUNTER — HOSPITAL ENCOUNTER (OUTPATIENT)
Age: 40
Discharge: HOME | End: 2025-08-08
Attending: OBSTETRICS & GYNECOLOGY | Admitting: OBSTETRICS & GYNECOLOGY
Payer: COMMERCIAL

## 2025-08-15 ENCOUNTER — OB ABSTRACT (OUTPATIENT)
Age: 40
End: 2025-08-15

## 2025-08-19 ENCOUNTER — ROUTINE PRENATAL (OUTPATIENT)
Age: 40
End: 2025-08-19

## 2025-08-19 VITALS — SYSTOLIC BLOOD PRESSURE: 110 MMHG | BODY MASS INDEX: 25.86 KG/M2 | WEIGHT: 160.2 LBS | DIASTOLIC BLOOD PRESSURE: 62 MMHG

## 2025-08-19 DIAGNOSIS — Z36.85 SCREENING, ANTENATAL, FOR STREPTOCOCCUS B: ICD-10-CM

## 2025-08-19 DIAGNOSIS — Z3A.36 36 WEEKS GESTATION OF PREGNANCY: Primary | ICD-10-CM

## 2025-08-19 PROCEDURE — PNV: Performed by: OBSTETRICS & GYNECOLOGY

## 2025-08-22 LAB — GP B STREP DNA SPEC QL NAA+PROBE: NEGATIVE
